# Patient Record
Sex: FEMALE | Race: WHITE | NOT HISPANIC OR LATINO | Employment: FULL TIME | URBAN - METROPOLITAN AREA
[De-identification: names, ages, dates, MRNs, and addresses within clinical notes are randomized per-mention and may not be internally consistent; named-entity substitution may affect disease eponyms.]

---

## 2020-03-11 ENCOUNTER — HOSPITAL ENCOUNTER (OUTPATIENT)
Facility: MEDICAL CENTER | Age: 30
End: 2020-03-11
Attending: PREVENTIVE MEDICINE
Payer: COMMERCIAL

## 2020-03-11 ENCOUNTER — NON-PROVIDER VISIT (OUTPATIENT)
Dept: URGENT CARE | Facility: CLINIC | Age: 30
End: 2020-03-11
Payer: COMMERCIAL

## 2020-03-11 ENCOUNTER — OFFICE VISIT (OUTPATIENT)
Dept: URGENT CARE | Facility: CLINIC | Age: 30
End: 2020-03-11
Payer: COMMERCIAL

## 2020-03-11 VITALS
BODY MASS INDEX: 29.06 KG/M2 | SYSTOLIC BLOOD PRESSURE: 102 MMHG | HEART RATE: 89 BPM | WEIGHT: 164.02 LBS | HEIGHT: 63 IN | OXYGEN SATURATION: 99 % | DIASTOLIC BLOOD PRESSURE: 68 MMHG | TEMPERATURE: 97.5 F

## 2020-03-11 DIAGNOSIS — Z02.1 PRE-EMPLOYMENT DRUG SCREENING: Primary | ICD-10-CM

## 2020-03-11 DIAGNOSIS — Z02.1 PRE-EMPLOYMENT DRUG SCREENING: ICD-10-CM

## 2020-03-11 DIAGNOSIS — Z02.1 PRE-EMPLOYMENT HEALTH SCREENING EXAMINATION: ICD-10-CM

## 2020-03-11 LAB
AMP AMPHETAMINE: NORMAL
BAR BARBITURATES: NORMAL
BZO BENZODIAZEPINES: NORMAL
COC COCAINE: NORMAL
INT CON NEG: NORMAL
INT CON POS: NORMAL
MDMA ECSTASY: NORMAL
MET METHAMPHETAMINES: NORMAL
MTD METHADONE: NORMAL
OPI OPIATES: NORMAL
OXY OXYCODONE: NORMAL
PCP PHENCYCLIDINE: NORMAL
POC URINE DRUG SCREEN OCDRS: NEGATIVE
THC: NORMAL

## 2020-03-11 PROCEDURE — 8915 PR COMPREHENSIVE PHYSICAL: Performed by: NURSE PRACTITIONER

## 2020-03-11 PROCEDURE — 90686 IIV4 VACC NO PRSV 0.5 ML IM: CPT | Performed by: NURSE PRACTITIONER

## 2020-03-11 PROCEDURE — 94375 RESPIRATORY FLOW VOLUME LOOP: CPT | Performed by: PREVENTIVE MEDICINE

## 2020-03-11 PROCEDURE — 80305 DRUG TEST PRSMV DIR OPT OBS: CPT | Mod: QW | Performed by: PREVENTIVE MEDICINE

## 2020-03-11 PROCEDURE — 94010 BREATHING CAPACITY TEST: CPT | Performed by: PREVENTIVE MEDICINE

## 2020-03-11 PROCEDURE — 86787 VARICELLA-ZOSTER ANTIBODY: CPT | Performed by: PREVENTIVE MEDICINE

## 2020-03-11 RX ORDER — SUMATRIPTAN 100 MG/1
100 TABLET, FILM COATED ORAL
COMMUNITY
End: 2020-11-25

## 2020-03-11 SDOH — HEALTH STABILITY: MENTAL HEALTH: HOW OFTEN DO YOU HAVE A DRINK CONTAINING ALCOHOL?: 2-4 TIMES A MONTH

## 2020-03-11 ASSESSMENT — VISUAL ACUITY
OD_CC: 20/15
OS_CC: 20/25

## 2020-03-12 LAB — VZV IGG SER IA-ACNC: 1.82

## 2020-03-13 LAB
GAMMA INTERFERON BACKGROUND BLD IA-ACNC: 0.03 IU/ML
M TB IFN-G BLD-IMP: NEGATIVE
M TB IFN-G CD4+ BCKGRND COR BLD-ACNC: 0 IU/ML
MITOGEN IGNF BCKGRD COR BLD-ACNC: >10 IU/ML
QFT TB2 - NIL TBQ2: 0 IU/ML

## 2020-03-27 ENCOUNTER — EH NON-PROVIDER (OUTPATIENT)
Dept: OCCUPATIONAL MEDICINE | Facility: CLINIC | Age: 30
End: 2020-03-27
Payer: COMMERCIAL

## 2020-03-27 DIAGNOSIS — Z71.85 IMMUNIZATION COUNSELING: ICD-10-CM

## 2020-03-27 NOTE — PROGRESS NOTES
Pre-employment medical surveillance reviewed and signed. Quantiferon result documented in immunizations. Document returned to assigned MA.

## 2020-05-26 ENCOUNTER — TELEPHONE (OUTPATIENT)
Dept: SCHEDULING | Facility: IMAGING CENTER | Age: 30
End: 2020-05-26

## 2020-05-26 ENCOUNTER — TELEPHONE (OUTPATIENT)
Dept: HEALTH INFORMATION MANAGEMENT | Facility: OTHER | Age: 30
End: 2020-05-26

## 2020-05-26 NOTE — TELEPHONE ENCOUNTER
1. Caller Name:Marcio Stoll                Call Back Number:[phone]  [facility] PCP or Specialty Provider: No          2.  Does patient have any new or worsening symptoms of respiratory illness? No.    3.  Does patient have any comoribidities? None     4.  Has the patient traveled in the last 14 days OR had any known contact with someone who is suspected or confirmed to have COVID-19?  No.    5. Disposition: Cleared by RN Triage as potential is low for COVID-19; OK to keep/schedule appointment    Note routed to [facility] Provider: [facility] only.

## 2020-06-02 ENCOUNTER — OFFICE VISIT (OUTPATIENT)
Dept: MEDICAL GROUP | Facility: MEDICAL CENTER | Age: 30
End: 2020-06-02
Payer: COMMERCIAL

## 2020-06-02 ENCOUNTER — HOSPITAL ENCOUNTER (OUTPATIENT)
Dept: LAB | Facility: MEDICAL CENTER | Age: 30
End: 2020-06-02
Attending: FAMILY MEDICINE
Payer: COMMERCIAL

## 2020-06-02 VITALS
WEIGHT: 162 LBS | SYSTOLIC BLOOD PRESSURE: 124 MMHG | HEART RATE: 74 BPM | DIASTOLIC BLOOD PRESSURE: 78 MMHG | HEIGHT: 64 IN | RESPIRATION RATE: 16 BRPM | TEMPERATURE: 97.3 F | OXYGEN SATURATION: 96 % | BODY MASS INDEX: 27.66 KG/M2

## 2020-06-02 DIAGNOSIS — Z13.6 SCREENING FOR CARDIOVASCULAR CONDITION: ICD-10-CM

## 2020-06-02 DIAGNOSIS — R00.2 PALPITATIONS: ICD-10-CM

## 2020-06-02 DIAGNOSIS — J45.990 EXERCISE-INDUCED ASTHMA: ICD-10-CM

## 2020-06-02 DIAGNOSIS — R03.0 ELEVATED BLOOD PRESSURE READING: ICD-10-CM

## 2020-06-02 DIAGNOSIS — G43.109 MIGRAINE WITH AURA AND WITHOUT STATUS MIGRAINOSUS, NOT INTRACTABLE: ICD-10-CM

## 2020-06-02 DIAGNOSIS — M79.604 PAIN OF RIGHT LOWER EXTREMITY: ICD-10-CM

## 2020-06-02 LAB
ERYTHROCYTE [DISTWIDTH] IN BLOOD BY AUTOMATED COUNT: 41.8 FL (ref 35.9–50)
HCT VFR BLD AUTO: 43.4 % (ref 37–47)
HGB BLD-MCNC: 14.7 G/DL (ref 12–16)
MCH RBC QN AUTO: 31 PG (ref 27–33)
MCHC RBC AUTO-ENTMCNC: 33.9 G/DL (ref 33.6–35)
MCV RBC AUTO: 91.6 FL (ref 81.4–97.8)
PLATELET # BLD AUTO: 312 K/UL (ref 164–446)
PMV BLD AUTO: 10.3 FL (ref 9–12.9)
RBC # BLD AUTO: 4.74 M/UL (ref 4.2–5.4)
WBC # BLD AUTO: 6.1 K/UL (ref 4.8–10.8)

## 2020-06-02 PROCEDURE — 99204 OFFICE O/P NEW MOD 45 MIN: CPT | Performed by: FAMILY MEDICINE

## 2020-06-02 PROCEDURE — 85027 COMPLETE CBC AUTOMATED: CPT

## 2020-06-02 PROCEDURE — 84443 ASSAY THYROID STIM HORMONE: CPT

## 2020-06-02 PROCEDURE — 36415 COLL VENOUS BLD VENIPUNCTURE: CPT

## 2020-06-02 PROCEDURE — 80061 LIPID PANEL: CPT

## 2020-06-02 PROCEDURE — 80053 COMPREHEN METABOLIC PANEL: CPT

## 2020-06-02 RX ORDER — ALBUTEROL SULFATE 90 UG/1
2 AEROSOL, METERED RESPIRATORY (INHALATION) EVERY 6 HOURS PRN
COMMUNITY
End: 2020-06-02

## 2020-06-02 RX ORDER — SUMATRIPTAN 100 MG/1
100 TABLET, FILM COATED ORAL
COMMUNITY
End: 2020-06-02

## 2020-06-02 RX ORDER — ALBUTEROL SULFATE 90 UG/1
2 AEROSOL, METERED RESPIRATORY (INHALATION) EVERY 4 HOURS PRN
Qty: 1 INHALER | Refills: 3 | Status: SHIPPED | OUTPATIENT
Start: 2020-06-02 | End: 2020-11-25

## 2020-06-02 RX ORDER — SUMATRIPTAN 100 MG/1
100 TABLET, FILM COATED ORAL
Qty: 10 TAB | Refills: 3 | Status: SHIPPED | OUTPATIENT
Start: 2020-06-02 | End: 2020-11-25

## 2020-06-02 SDOH — HEALTH STABILITY: MENTAL HEALTH: HOW MANY STANDARD DRINKS CONTAINING ALCOHOL DO YOU HAVE ON A TYPICAL DAY?: 1 OR 2

## 2020-06-02 SDOH — HEALTH STABILITY: MENTAL HEALTH: HOW OFTEN DO YOU HAVE A DRINK CONTAINING ALCOHOL?: 2-4 TIMES A MONTH

## 2020-06-02 SDOH — HEALTH STABILITY: MENTAL HEALTH: HOW OFTEN DO YOU HAVE 6 OR MORE DRINKS ON ONE OCCASION?: NEVER

## 2020-06-02 ASSESSMENT — PATIENT HEALTH QUESTIONNAIRE - PHQ9: CLINICAL INTERPRETATION OF PHQ2 SCORE: 0

## 2020-06-02 NOTE — PROGRESS NOTES
cc: Blood pressure    Subjective:     Marcio Stoll is a 29 y.o. female presenting to [facility] care:    1.  Blood pressure: She reports having elevated blood pressures about 6 months ago.  She was getting readings in the 150s to 170s systolic consistently.  She also had elevated heart rates to the 130s recorded on her Fitbit, palpitations, intermittent chest pain.  She does report going through a significant amount of stress at that time, was finishing up school, getting , moving to [place], etc.  At her previous provider's office, she was planning to get a Holter monitor and a referral to cardiology, but was unable to complete that.  She reports that blood work almost a year ago was normal.  She reports taking lisinopril for a couple months, but has been off for about 3 months.  The lisinopril seemed to work well when she was on it.  She has been taking various supplements, walking daily, exercising regularly, has lost about 12 pounds.  She reports having a healthy, low-salt diet.  She is a nurse on the telemetry floor, and she does admit to a fair amount of anxiety over her own health based on what she sees in the hospital.  She feels like anxiety seems to be getting worse over time.    2.  Knee pain: Has been having a dull, and tense ache in the back of her right knee for the past week.  It radiates up to her thigh and the top of her shin.  She has tried compression socks, massaging the area.  Denies any redness, swelling, injuries, numbness, tingling.  She is primarily worried about a blood clot.    3.  Asthma: She reports a history of exercise-induced asthma, well controlled with an albuterol before and after exercise.  Denies any cough, shortness of breath currently.    4.  Migraines: Has a history of migraines with auras, less vision changes.  Has been stable, uses Excedrin, Imitrex, Benadryl as needed.  Has at most 3 migraines a month.  She reports being on birth control in the past, but has been off  "of birth control for the past 6 months.  She and her significant other are considering having children.      Review of systems:  See above.  All other systems are reviewed and are negative      Current Outpatient Medications:   •  albuterol 108 (90 Base) MCG/ACT Aero Soln inhalation aerosol, Inhale 2 Puffs by mouth every four hours as needed for Shortness of Breath., Disp: 1 Inhaler, Rfl: 3  •  sumatriptan (IMITREX) 100 MG tablet, Take 1 Tab by mouth Once PRN for Migraine for up to 1 dose., Disp: 10 Tab, Rfl: 3    Allergies, past medical history, past surgical history, family history, social history reviewed and updated    Objective:     Vitals: /78   Pulse 74   Temp 36.3 °C (97.3 °F)   Resp 16   Ht 1.613 m (5' 3.5\")   Wt 73.5 kg (162 lb)   LMP 05/28/2020 (Approximate)   SpO2 96%   BMI 28.25 kg/m²   General: Alert, pleasant, NAD  HEENT: Normocephalic.  EOMI, no icterus or pallor.  Conjunctivae and lids normal. External ears normal. Oropharynx non-erythematous, mucous membranes moist.  Neck supple.  No thyromegaly or masses palpated. No cervical or supraclavicular lymphadenopathy.  Heart: Regular rate and rhythm.  S1 and S2 normal.  No murmurs appreciated.  Respiratory: Normal respiratory effort.  Clear to auscultation bilaterally.  Abdomen: Non-distended, soft  Skin: Warm, dry, no rashes.  Musculoskeletal: Gait is normal.  Moves all extremities well.  Extremities: No leg edema.  Distal pulses 2+ bilaterally  Psych:  Affect/mood is normal, judgement is good, memory is intact, grooming is appropriate.    Assessment/Plan:     Marcio was seen today for establish care.    Diagnoses and all orders for this visit:    Elevated blood pressure reading  Palpitations  New problems.  We discussed work-up of her elevated blood pressures, will do a 24-hour blood pressure monitor, Holter, labs.  Recommended stopping her supplements, continue with a healthy diet, regular exercise.  Also recommended starting a prenatal " vitamin.  Advised to not take lisinopril as she is considering getting pregnant.  We discussed if her work-up is unremarkable, counseling/cognitive behavioral therapy may be helpful for her.  It seems like her anxiety may be a significant trigger for her.  She was open to this, will refer her if needed  -     REFERRAL TO 24-HOUR BLOOD PRESSURE MONITORING  -     Holter Monitor / Event Recorder; Future  -     CBC WITHOUT DIFFERENTIAL; Future  -     Comp Metabolic Panel; Future  -     TSH WITH REFLEX TO FT4; Future    Pain of right lower extremity  New problem.  Discussed that a blood clot seems very unlikely, but will check an ultrasound for reassurance.  If normal and if symptoms persists, we discussed a referral to physical therapy  -     US-EXTREMITY VENOUS LOWER UNILAT RIGHT; Future    Exercise-induced asthma  New problem, stable, continue albuterol as needed  -     albuterol 108 (90 Base) MCG/ACT Aero Soln inhalation aerosol; Inhale 2 Puffs by mouth every four hours as needed for Shortness of Breath.    Migraine with aura and without status migrainosus, not intractable  New problem, stable, continue Imitrex as needed  -     sumatriptan (IMITREX) 100 MG tablet; Take 1 Tab by mouth Once PRN for Migraine for up to 1 dose.    Screening for cardiovascular condition  -     Lipid Profile; Future          Return in about 3 months (around 9/2/2020) for routine follow up.

## 2020-06-03 LAB
ALBUMIN SERPL BCP-MCNC: 4.4 G/DL (ref 3.2–4.9)
ALBUMIN/GLOB SERPL: 1.7 G/DL
ALP SERPL-CCNC: 45 U/L (ref 30–99)
ALT SERPL-CCNC: 24 U/L (ref 2–50)
ANION GAP SERPL CALC-SCNC: 11 MMOL/L (ref 7–16)
AST SERPL-CCNC: 17 U/L (ref 12–45)
BILIRUB SERPL-MCNC: 0.9 MG/DL (ref 0.1–1.5)
BUN SERPL-MCNC: 9 MG/DL (ref 8–22)
CALCIUM SERPL-MCNC: 9.6 MG/DL (ref 8.5–10.5)
CHLORIDE SERPL-SCNC: 97 MMOL/L (ref 96–112)
CHOLEST SERPL-MCNC: 182 MG/DL (ref 100–199)
CO2 SERPL-SCNC: 28 MMOL/L (ref 20–33)
CREAT SERPL-MCNC: 0.64 MG/DL (ref 0.5–1.4)
FASTING STATUS PATIENT QL REPORTED: NORMAL
GLOBULIN SER CALC-MCNC: 2.6 G/DL (ref 1.9–3.5)
GLUCOSE SERPL-MCNC: 92 MG/DL (ref 65–99)
HDLC SERPL-MCNC: 63 MG/DL
LDLC SERPL CALC-MCNC: 106 MG/DL
POTASSIUM SERPL-SCNC: 3.4 MMOL/L (ref 3.6–5.5)
PROT SERPL-MCNC: 7 G/DL (ref 6–8.2)
SODIUM SERPL-SCNC: 136 MMOL/L (ref 135–145)
TRIGL SERPL-MCNC: 67 MG/DL (ref 0–149)
TSH SERPL DL<=0.005 MIU/L-ACNC: 1.35 UIU/ML (ref 0.38–5.33)

## 2020-06-05 ENCOUNTER — HOSPITAL ENCOUNTER (OUTPATIENT)
Dept: RADIOLOGY | Facility: MEDICAL CENTER | Age: 30
End: 2020-06-05
Attending: FAMILY MEDICINE
Payer: COMMERCIAL

## 2020-06-05 DIAGNOSIS — M79.604 PAIN OF RIGHT LOWER EXTREMITY: ICD-10-CM

## 2020-06-05 PROCEDURE — 93971 EXTREMITY STUDY: CPT | Mod: RT

## 2020-06-05 PROCEDURE — 93971 EXTREMITY STUDY: CPT | Mod: 26 | Performed by: INTERNAL MEDICINE

## 2020-06-15 ENCOUNTER — NON-PROVIDER VISIT (OUTPATIENT)
Dept: VASCULAR LAB | Facility: MEDICAL CENTER | Age: 30
End: 2020-06-15
Attending: INTERNAL MEDICINE
Payer: COMMERCIAL

## 2020-06-15 PROCEDURE — 93786 AMBL BP MNTR W/SW REC ONLY: CPT

## 2020-06-15 PROCEDURE — 93788 AMBL BP MNTR W/SW A/R: CPT

## 2020-06-23 NOTE — PROGRESS NOTES
Ambulatory blood pressure monitor results reviewed  Full report under media tab  Inadequate sampling with multiple error messages.  Only 24% of readings successful.    Mean of available readings 117/83  She also took home readings which will be scanned in separately to the media tab    Clinical correlation needed.  We will defer further follow-up to PCP unless otherwise requested    Michael Bloch, MD  [facility]    Cc: Dr. Thornton

## 2020-07-21 ENCOUNTER — HOSPITAL ENCOUNTER (OUTPATIENT)
Dept: LAB | Facility: MEDICAL CENTER | Age: 30
End: 2020-07-21
Attending: FAMILY MEDICINE
Payer: COMMERCIAL

## 2020-07-21 ENCOUNTER — PATIENT MESSAGE (OUTPATIENT)
Dept: MEDICAL GROUP | Facility: MEDICAL CENTER | Age: 30
End: 2020-07-21

## 2020-07-21 DIAGNOSIS — Z34.90 PREGNANCY, UNSPECIFIED GESTATIONAL AGE: ICD-10-CM

## 2020-07-21 DIAGNOSIS — N92.6 MISSED PERIOD: ICD-10-CM

## 2020-07-21 LAB — HCG SERPL QL: POSITIVE

## 2020-07-21 PROCEDURE — 36415 COLL VENOUS BLD VENIPUNCTURE: CPT

## 2020-07-21 PROCEDURE — 84703 CHORIONIC GONADOTROPIN ASSAY: CPT

## 2020-08-07 ENCOUNTER — OFFICE VISIT (OUTPATIENT)
Dept: URGENT CARE | Facility: PHYSICIAN GROUP | Age: 30
End: 2020-08-07
Payer: COMMERCIAL

## 2020-08-07 VITALS
BODY MASS INDEX: 28.85 KG/M2 | DIASTOLIC BLOOD PRESSURE: 76 MMHG | SYSTOLIC BLOOD PRESSURE: 138 MMHG | HEIGHT: 64 IN | WEIGHT: 169 LBS | OXYGEN SATURATION: 98 % | HEART RATE: 87 BPM | TEMPERATURE: 98.5 F | RESPIRATION RATE: 18 BRPM

## 2020-08-07 DIAGNOSIS — N92.6 MISSED PERIOD: ICD-10-CM

## 2020-08-07 DIAGNOSIS — Z32.01 POSITIVE PREGNANCY TEST: ICD-10-CM

## 2020-08-07 LAB
INT CON NEG: NORMAL
INT CON POS: NORMAL
POC URINE PREGNANCY TEST: POSITIVE

## 2020-08-07 PROCEDURE — 81025 URINE PREGNANCY TEST: CPT | Performed by: NURSE PRACTITIONER

## 2020-08-07 PROCEDURE — 99214 OFFICE O/P EST MOD 30 MIN: CPT | Performed by: NURSE PRACTITIONER

## 2020-08-07 SDOH — HEALTH STABILITY: MENTAL HEALTH: HOW MANY STANDARD DRINKS CONTAINING ALCOHOL DO YOU HAVE ON A TYPICAL DAY?: NOT ASKED

## 2020-08-07 SDOH — HEALTH STABILITY: MENTAL HEALTH: HOW OFTEN DO YOU HAVE A DRINK CONTAINING ALCOHOL?: NOT ASKED

## 2020-08-07 ASSESSMENT — ENCOUNTER SYMPTOMS
FLANK PAIN: 0
SHORTNESS OF BREATH: 0
HEADACHES: 0
COUGH: 0
NAUSEA: 0
FEVER: 0
CHILLS: 0
DIZZINESS: 0
WHEEZING: 0
VOMITING: 0
DIARRHEA: 0
PALPITATIONS: 0
ABDOMINAL PAIN: 0
CONSTIPATION: 0

## 2020-08-07 ASSESSMENT — FIBROSIS 4 INDEX: FIB4 SCORE: 0.33

## 2020-08-07 NOTE — PROGRESS NOTES
Subjective:   Marcio Stoll  is a 29 y.o. female who presents for Other (Poss pregnant)        29-year-old female patient reports urgent care for missed period and possible pregnancy.  Patient states that she has taken pregnancy test at home and is noticing that the pink lines are becoming more faint so she is making sure that she is still pregnant.  Patient's last period was June 26.  Does have an OB appointment scheduled at the end of August.  Patient denies any spotting, abdominal pain but does note some mild cramping.  Is currently taking a prenatal vitamin avoiding alcohol, exercising and cleaning up her diet.    Other  This is a new problem. Pertinent negatives include no abdominal pain, chills, coughing, fever, headaches, nausea or vomiting.     Review of Systems   Constitutional: Negative for chills, fever and malaise/fatigue.   Respiratory: Negative for cough, shortness of breath and wheezing.    Cardiovascular: Negative for palpitations.   Gastrointestinal: Negative for abdominal pain, constipation, diarrhea, nausea and vomiting.   Genitourinary: Negative for dysuria, flank pain, frequency, hematuria and urgency.   Musculoskeletal: Negative for joint pain.   Neurological: Negative for dizziness and headaches.     History reviewed. No pertinent past medical history. History reviewed. No pertinent surgical history.   Social History     Socioeconomic History   • Marital status:      Spouse name: Not on file   • Number of children: Not on file   • Years of education: Not on file   • Highest education level: Not on file   Occupational History   • Not on file   Social Needs   • Financial resource strain: Not on file   • Food insecurity     Worry: Not on file     Inability: Not on file   • Transportation needs     Medical: Not on file     Non-medical: Not on file   Tobacco Use   • Smoking status: Never Smoker   • Smokeless tobacco: Never Used   Substance and Sexual Activity   • Alcohol use: Not  "Currently     Binge frequency: Never     Comment: socially   • Drug use: Never   • Sexual activity: Yes   Lifestyle   • Physical activity     Days per week: Not on file     Minutes per session: Not on file   • Stress: Not on file   Relationships   • Social connections     Talks on phone: Not on file     Gets together: Not on file     Attends [employer] service: Not on file     Active member of club or organization: Not on file     Attends meetings of clubs or organizations: Not on file     Relationship status: Not on file   • Intimate partner violence     Fear of current or ex partner: Not on file     Emotionally abused: Not on file     Physically abused: Not on file     Forced sexual activity: Not on file   Other Topics Concern   • Not on file   Social History Narrative    ** Merged History Encounter **         RN on tele floor at [employer]    Patient has no known allergies.       Objective:   /76   Pulse 87   Temp 36.9 °C (98.5 °F) (Temporal)   Resp 18   Ht 1.626 m (5' 4\")   Wt 76.7 kg (169 lb)   LMP 06/26/2020 (Exact Date)   SpO2 98%   BMI 29.01 kg/m²   Physical Exam  Vitals signs reviewed.   Constitutional:       Appearance: Normal appearance.   Cardiovascular:      Rate and Rhythm: Normal rate and regular rhythm.      Heart sounds: Normal heart sounds.   Pulmonary:      Effort: Pulmonary effort is normal.      Breath sounds: Normal breath sounds.   Skin:     General: Skin is warm.   Neurological:      Mental Status: She is alert and oriented to person, place, and time.   Psychiatric:         Mood and Affect: Mood normal.         Behavior: Behavior normal.         Thought Content: Thought content normal.         Judgment: Judgment normal.           Assessment/Plan:     1. Missed period  - POCT PREGNANCY - Positive    2. Positive pregnancy test    Discussed positive pregnancy test is indicative of a positive pregnancy.  Advised patient to continue to use prenatals, eat a well-balanced diet try to reduce " stress, avoid secondhand smoke and alcohol and increase fluids.  Advised patient to keep appointment with OB for further prenatal care.    Supportive care, differential diagnoses, and indications for immediate follow-up discussed with patient.    Pathogenesis of diagnosis discussed including typical length and natural progression. Patient expresses understanding and agrees to plan.    Instructed patient to return to clinic for worsening symptoms or symptoms that persist for 7 to 10 days     Please note that this dictation was created using voice recognition software. I have made every reasonable attempt to correct obvious errors, but I expect that there are errors of grammar and possibly content that I did not discover before finalizing the note.

## 2020-08-27 ENCOUNTER — GYNECOLOGY VISIT (OUTPATIENT)
Dept: OBGYN | Facility: CLINIC | Age: 30
End: 2020-08-27
Payer: COMMERCIAL

## 2020-08-27 VITALS
DIASTOLIC BLOOD PRESSURE: 99 MMHG | SYSTOLIC BLOOD PRESSURE: 128 MMHG | BODY MASS INDEX: 28.63 KG/M2 | WEIGHT: 167.7 LBS | HEIGHT: 64 IN

## 2020-08-27 DIAGNOSIS — G43.109 MIGRAINE WITH AURA AND WITHOUT STATUS MIGRAINOSUS, NOT INTRACTABLE: ICD-10-CM

## 2020-08-27 DIAGNOSIS — J45.990 EXERCISE-INDUCED ASTHMA: ICD-10-CM

## 2020-08-27 DIAGNOSIS — N93.8 DUB (DYSFUNCTIONAL UTERINE BLEEDING): ICD-10-CM

## 2020-08-27 DIAGNOSIS — Z86.711 HISTORY OF PULMONARY EMBOLISM: ICD-10-CM

## 2020-08-27 LAB
INT CON NEG: NEGATIVE
INT CON POS: POSITIVE
POC URINE PREGNANCY TEST: POSITIVE

## 2020-08-27 PROCEDURE — 76801 OB US < 14 WKS SINGLE FETUS: CPT | Performed by: OBSTETRICS & GYNECOLOGY

## 2020-08-27 PROCEDURE — 99203 OFFICE O/P NEW LOW 30 MIN: CPT | Performed by: OBSTETRICS & GYNECOLOGY

## 2020-08-27 PROCEDURE — 81025 URINE PREGNANCY TEST: CPT | Performed by: OBSTETRICS & GYNECOLOGY

## 2020-08-27 ASSESSMENT — FIBROSIS 4 INDEX: FIB4 SCORE: 0.33

## 2020-08-27 NOTE — PROGRESS NOTES
"Chief complaint: Secondary menorrhea    Marcio Stoll,  29 y.o.  1 para 0 at 8 weeks and 6 days gestational age by last menstrual period of 2020.  presents today with complaint of dysfunctional uterine bleeding.  Positive pregnancy test at home    Subjective : Patient presents to the office for absent menses.      Patient has a history of asthma, pulmonary embolism while on birth control pills and migraines with aura    Nausea/Vomiting: No    Abdominal /pelvic cramping: No  Vaginal bleeding: No  Positive home UPT yes  Other symptoms: None    Pertinent positives documented in HPI and all other systems reviewed & are negative    OB History   No obstetric history on file.       Past Gyn history: last pap 1 year ago in [place], hx STDs none    Past Medical History:   Diagnosis Date   • Asthma     Dx'd as child   • Clotting disorder (HCC)     Dx'd on    • Migraine        History reviewed. No pertinent surgical history.    Meds: Prenatal vitamins, Imitrex    Allergies: Patient has no known allergies.    Physical Exam:    /99   Ht 1.626 m (5' 4\")   Wt 76.1 kg (167 lb 11.2 oz)   BMI 28.79 kg/m²   Gen: well-appearing, well-hydrated, well-nourished  HEENT: normal;   PERRLA, EOMI, sclera clear  Lungs: Clear to auscultation  Heart: RRR No M  Abd: abdomen is soft without significant tenderness, masses, organomegaly or guarding  Pelvic: External genitalia normal  Ext: NT bilaterally, no cyanosis, clubbing or edema    Recent Results (from the past 336 hour(s))   POCT Pregnancy    Collection Time: 20 10:32 AM   Result Value Ref Range    POC Urine Pregnancy Test POSITIVE Negative    Internal Control Positive Positive     Internal Control Negative Negative        Ultrasound:     Transvaginal US performed and per my read:    Indication: Dating    Findings: marcus intrauterine pregnancy @9 weeks and 4 days gestational age by CRL.   Positive gestational sac.  Positive yolk sac. "   Positive fetal cardiac activity @180 BPM.   Right ovary normal. Left Ovary normal. Cervical length 3.8 cm.   No free fluid in the cul-de-sac.    Impression: viable IUP @9 weeks and 4 days. EDC by US of March 28, 2021      Assessment:  29 y.o. No obstetric history on file.  amenorrhea  Pregnancy exam/test positive    Plan:  3 weeks for new OB appt  Pap smear at new OB visit  Normal pregnancy symptoms discussed  SAB/labor precautions educated  Order prenatal labs and any additional imaging needed at new OB visit  Drink at least 2 liters of water daily  Exercise 30 minutes daily  Call MD santos/ questions or concerns    We will discuss NIPT versus quad screen at next visit.  CF and SMA testing will also be offered    As far as asthma is concerned, discussed with the patient symptoms of worsening disease.  If she begins to use her inhaler more than twice a week or has any nighttime symptoms, she is instructed to return to the clinic or call us    Patient does have a history of migraine with aura.  Has been on Imitrex.  Discussed with the patient that if possible, avoid use of Imitrex in pregnancy.  Other modalities for management of migraine pain were discussed the patient.  If no improvement, may discuss Fioricet with patient    Patient has a history of a pulmonary embolism while on estrogen.  Given this past history of a pulmonary realism and the fact that she is pregnant, patient is at increased risk for another thrombotic event.  Discussed with patient pros and cons of anticoagulation during pregnancy.  We will begin anticoagulation given her history.    Patient also reports that for the last few months her diastolic pressures also been elevated.  We will continue to monitor.  In addition to normal OB labs, will also obtain baseline 24-hour protein as well as complete metabolic panel.  If diastolic pressure continues to be elevated, patient may need antihypertensive treatment during the pregnancy.    Also discussed the  patient possibility of ASA 81 mg treatment during her pregnancy to decrease risk of preeclampsia given her risk factors.    Final due date March 28, 2021, consistent with today's ultrasound    All questions answered

## 2020-08-27 NOTE — NON-PROVIDER
"DUB/Pregnancy Test  LMP:6/26/20  UPT positive, done in clinic.  Good phone #:[phone]  Pharmacy verified.  Pt states no other complaints for today.  Pt states \" and planned pregnancy. FOB is involved and supportive\".  "

## 2020-09-02 ENCOUNTER — APPOINTMENT (OUTPATIENT)
Dept: RADIOLOGY | Facility: MEDICAL CENTER | Age: 30
End: 2020-09-02
Attending: EMERGENCY MEDICINE
Payer: COMMERCIAL

## 2020-09-02 ENCOUNTER — HOSPITAL ENCOUNTER (EMERGENCY)
Facility: MEDICAL CENTER | Age: 30
End: 2020-09-02
Attending: EMERGENCY MEDICINE
Payer: COMMERCIAL

## 2020-09-02 VITALS
OXYGEN SATURATION: 99 % | HEART RATE: 98 BPM | WEIGHT: 171.52 LBS | HEIGHT: 64 IN | TEMPERATURE: 98.4 F | RESPIRATION RATE: 16 BRPM | BODY MASS INDEX: 29.28 KG/M2 | DIASTOLIC BLOOD PRESSURE: 91 MMHG | SYSTOLIC BLOOD PRESSURE: 127 MMHG

## 2020-09-02 DIAGNOSIS — O20.0 THREATENED MISCARRIAGE IN EARLY PREGNANCY: ICD-10-CM

## 2020-09-02 DIAGNOSIS — Z3A.09 9 WEEKS GESTATION OF PREGNANCY: ICD-10-CM

## 2020-09-02 LAB
ACTION RH IMMUNE GLOB 8505RHG: NORMAL
ALBUMIN SERPL BCP-MCNC: 3.8 G/DL (ref 3.2–4.9)
ALBUMIN/GLOB SERPL: 1.5 G/DL
ALP SERPL-CCNC: 32 U/L (ref 30–99)
ALT SERPL-CCNC: 29 U/L (ref 2–50)
ANION GAP SERPL CALC-SCNC: 11 MMOL/L (ref 7–16)
APPEARANCE UR: ABNORMAL
AST SERPL-CCNC: 13 U/L (ref 12–45)
B-HCG SERPL-ACNC: ABNORMAL MIU/ML (ref 0–5)
BACTERIA #/AREA URNS HPF: NEGATIVE /HPF
BASOPHILS # BLD AUTO: 0.3 % (ref 0–1.8)
BASOPHILS # BLD: 0.04 K/UL (ref 0–0.12)
BILIRUB SERPL-MCNC: 0.3 MG/DL (ref 0.1–1.5)
BILIRUB UR QL STRIP.AUTO: NEGATIVE
BUN SERPL-MCNC: 10 MG/DL (ref 8–22)
CALCIUM SERPL-MCNC: 8.8 MG/DL (ref 8.5–10.5)
CHLORIDE SERPL-SCNC: 104 MMOL/L (ref 96–112)
CO2 SERPL-SCNC: 22 MMOL/L (ref 20–33)
COLOR UR: YELLOW
CREAT SERPL-MCNC: 0.39 MG/DL (ref 0.5–1.4)
EOSINOPHIL # BLD AUTO: 0.16 K/UL (ref 0–0.51)
EOSINOPHIL NFR BLD: 1.4 % (ref 0–6.9)
EPI CELLS #/AREA URNS HPF: NEGATIVE /HPF
ERYTHROCYTE [DISTWIDTH] IN BLOOD BY AUTOMATED COUNT: 41.4 FL (ref 35.9–50)
GLOBULIN SER CALC-MCNC: 2.6 G/DL (ref 1.9–3.5)
GLUCOSE SERPL-MCNC: 96 MG/DL (ref 65–99)
GLUCOSE UR STRIP.AUTO-MCNC: NEGATIVE MG/DL
HCT VFR BLD AUTO: 38.1 % (ref 37–47)
HGB BLD-MCNC: 13 G/DL (ref 12–16)
HYALINE CASTS #/AREA URNS LPF: ABNORMAL /LPF
IMM GRANULOCYTES # BLD AUTO: 0.04 K/UL (ref 0–0.11)
IMM GRANULOCYTES NFR BLD AUTO: 0.3 % (ref 0–0.9)
KETONES UR STRIP.AUTO-MCNC: NEGATIVE MG/DL
LEUKOCYTE ESTERASE UR QL STRIP.AUTO: NEGATIVE
LYMPHOCYTES # BLD AUTO: 2.48 K/UL (ref 1–4.8)
LYMPHOCYTES NFR BLD: 21 % (ref 22–41)
MCH RBC QN AUTO: 31 PG (ref 27–33)
MCHC RBC AUTO-ENTMCNC: 34.1 G/DL (ref 33.6–35)
MCV RBC AUTO: 90.9 FL (ref 81.4–97.8)
MICRO URNS: ABNORMAL
MONOCYTES # BLD AUTO: 0.77 K/UL (ref 0–0.85)
MONOCYTES NFR BLD AUTO: 6.5 % (ref 0–13.4)
NEUTROPHILS # BLD AUTO: 8.31 K/UL (ref 2–7.15)
NEUTROPHILS NFR BLD: 70.5 % (ref 44–72)
NITRITE UR QL STRIP.AUTO: NEGATIVE
NRBC # BLD AUTO: 0 K/UL
NRBC BLD-RTO: 0 /100 WBC
NUMBER OF RH DOSES IND 8505RD: 1
PH UR STRIP.AUTO: 7 [PH] (ref 5–8)
PLATELET # BLD AUTO: 306 K/UL (ref 164–446)
PMV BLD AUTO: 9.8 FL (ref 9–12.9)
POTASSIUM SERPL-SCNC: 3.5 MMOL/L (ref 3.6–5.5)
PROT SERPL-MCNC: 6.4 G/DL (ref 6–8.2)
PROT UR QL STRIP: NEGATIVE MG/DL
RBC # BLD AUTO: 4.19 M/UL (ref 4.2–5.4)
RBC # URNS HPF: ABNORMAL /HPF
RBC UR QL AUTO: ABNORMAL
RH BLD: NORMAL
SODIUM SERPL-SCNC: 137 MMOL/L (ref 135–145)
SP GR UR STRIP.AUTO: 1.01
UROBILINOGEN UR STRIP.AUTO-MCNC: 0.2 MG/DL
WBC # BLD AUTO: 11.8 K/UL (ref 4.8–10.8)
WBC #/AREA URNS HPF: ABNORMAL /HPF

## 2020-09-02 PROCEDURE — 700102 HCHG RX REV CODE 250 W/ 637 OVERRIDE(OP): Performed by: EMERGENCY MEDICINE

## 2020-09-02 PROCEDURE — 76801 OB US < 14 WKS SINGLE FETUS: CPT

## 2020-09-02 PROCEDURE — 86901 BLOOD TYPING SEROLOGIC RH(D): CPT

## 2020-09-02 PROCEDURE — 96374 THER/PROPH/DIAG INJ IV PUSH: CPT

## 2020-09-02 PROCEDURE — 85025 COMPLETE CBC W/AUTO DIFF WBC: CPT

## 2020-09-02 PROCEDURE — 81001 URINALYSIS AUTO W/SCOPE: CPT

## 2020-09-02 PROCEDURE — 99284 EMERGENCY DEPT VISIT MOD MDM: CPT

## 2020-09-02 PROCEDURE — A9270 NON-COVERED ITEM OR SERVICE: HCPCS | Performed by: EMERGENCY MEDICINE

## 2020-09-02 PROCEDURE — 80053 COMPREHEN METABOLIC PANEL: CPT

## 2020-09-02 PROCEDURE — 84702 CHORIONIC GONADOTROPIN TEST: CPT

## 2020-09-02 RX ORDER — ACETAMINOPHEN 325 MG/1
650 TABLET ORAL ONCE
Status: COMPLETED | OUTPATIENT
Start: 2020-09-02 | End: 2020-09-02

## 2020-09-02 RX ADMIN — ACETAMINOPHEN 650 MG: 325 TABLET, FILM COATED ORAL at 16:30

## 2020-09-02 ASSESSMENT — FIBROSIS 4 INDEX: FIB4 SCORE: 0.33

## 2020-09-02 NOTE — ED PROVIDER NOTES
ED Provider Note    CHIEF COMPLAINT  Chief Complaint   Patient presents with   • Vaginal Bleeding   • Pregnancy       HPI  Marcio Stoll is a 29 y.o. female who presents for evaluation of vaginal bleeding and pregnancy.  The patient is G1, P0 Ab0 whose LMP was 6/26/2020.  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  The patient states she went to bed around 9 AM this morning and woke up with a headache around 130.  At that time she noticed which she felt like was some passage of fluid or leaking from her vaginal area and noticed some blood.  The patient's not been having persistent cramping is not passing any large clots or tissue that she is aware of.  Patient does relate a history of borderline diastolic hypertension but has not received any work-up or treatment.  She states that last December she was having palpitations but was never able to be seen for Holter monitoring.  The patient's had no recent illness including: Fever, chills, URI symptoms, cardiorespiratory symptoms, nausea, vomiting, hematuria, dysuria.  No other acute symptomatology or complaints.    REVIEW OF SYSTEMS  See HPI for further details. All other systems negative.    PAST MEDICAL HISTORY  Past Medical History:   Diagnosis Date   • Asthma     Dx'd as child   • Clotting disorder (HCC)     Dx'd on 2010   • History of pulmonary embolism 8/27/2020   • Hypertension    • Migraine        FAMILY HISTORY  Family History   Problem Relation Age of Onset   • Hypertension Mother    • Alcohol abuse Mother    • Hypertension Father    • Breast Cancer Maternal Aunt    • Hypertension Maternal Grandmother    • Diabetes Maternal Grandmother    • Hypertension Paternal Grandmother    • Diabetes Paternal Grandmother        SOCIAL HISTORY  Resides locally;    SURGICAL HISTORY  History reviewed. No pertinent surgical history.    CURRENT MEDICATIONS  See nurse's notes    ALLERGIES  No Known Allergies    PHYSICAL  "EXAM  VITAL SIGNS: /110   Pulse (!) 120   Temp 36.9 °C (98.4 °F) (Oral)   Resp 20   Ht 1.626 m (5' 4\")   Wt 77.8 kg (171 lb 8.3 oz)   LMP 06/26/2020 (Exact Date)   SpO2 98%   BMI 29.44 kg/m²    Constitutional: Pleasant 29-year-old female, well developed, Well nourished, No acute distress, Non-toxic appearance.   HENT: Normocephalic;  Eyes: PERRL, EOMI, Conjunctiva normal, No discharge.   Neck: Normal range of motion, No tenderness, Supple, No stridor.   Lymphatic: No lymphadenopathy noted.   Cardiovascular: Regular rate and rhythm without mumurs, gallups, rubs   Thorax & Lungs: Normal Equal breath sounds, No respiratory distress, No wheezing, no stridor, no rales. No chest tenderness.   Abdomen: Soft, nontender, nondistended, no organomegaly, positive bowel sounds normal in quality. No guarding or rebound.  Pelvic examination performed with female nurse escort in the room; external genitalia is normal; small amount of blood in the vaginal vault, no lesions identified, cervical loss is closed with no tissue, uterus approximately 10 weeks size, adnexa is nontender without masses or fullness;  Skin: Good skin turgor, pink, warm, dry. No rashes, petechiae, purpura. Normal capillary refill.   Back: No tenderness, No CVA tenderness.   Extremities: Intact distal pulses, No edema, No tenderness, No cyanosis,  Vascular: Pulses are 2+, symmetric in the upper and lower extremities.  Musculoskeletal: Good range of motion in all major joints. No tenderness to palpation or major deformities noted.   Neurologic: Alert & oriented x 3,  No gross focal deficits noted.   Psychiatric: Affect normal, Judgment normal, Mood normal.       RADIOLOGY/PROCEDURES  US-OB 1ST TRIMESTER WITH TRANSVAGINAL (COMBO)   Final Result      1.  Single live intrauterine gestation of an estimated 9 weeks 3 days.   2.  Probable multifocal small subchorionic hemorrhage.  Follow-up recommended.   3.  Small amount of fluid noted in the cervix.    "         COURSE & MEDICAL DECISION MAKING  Pertinent Labs & Imaging studies reviewed. (See chart for details)  1.  RhoGam    Laboratory studies: CBC shows white count of 11.8, 70% neutrophils, 21% lymphocytes, 6% monocytes, hemoglobin 13.0 hematocrit 30.1; CMP shows potassium 3.5 otherwise within normal; urinalysis is negative for nitrite and leukocyte esterase, WBC 0-2, RBCs , epithelial cells negative, bacteria negative; quantitative beta-hCG is 70,820; Rh is negative;    Discussion: At this time, the patient has findings consistent with threatened .  Patient has a single live intrauterine gestation of 9 weeks 3 days by ultra sonography.  There is probable multi focal subchorionic hemorrhage.  The patient is Rh- and will be given RhoGam.  At this time, I discussed the findings and treatment plan with the patient.  The patient is to be a pelvic rest.  She was instructed to follow-up with her OB/GYN physician next week.  She is also to remember to have her blood pressure monitor that she has been having borderline diastolic hypertension.  She was instructed to get rechecked at any time if change worsening symptoms in interim.  I see no indication for following serial quantitative hCGs based on confirmed intrauterine pregnancy with cardiac activity.  The patient indicates she is comfortable with this explanation disposition.    FINAL IMPRESSION  1. Threatened miscarriage in early pregnancy    2. 9 weeks gestation of pregnancy        PLAN  1.  Appropriate discharge instructions given  2.  Follow-up with OB/GYN  3.  Recheck if change worsening symptoms, as discussed    Electronically signed by: Guy G Gansert, M.D., 2020 4:08 PM

## 2020-09-02 NOTE — ED TRIAGE NOTES
30 y/o female ambulatory to triage with c/o onset of vaginal bleeding today while at home. Pt states the blood was bright red but did not fill the toilet bowl, she has a pad on now but states she can feel that the bleeding has continued. Pt had her 1st OB appointment last week to confirm IUP. She denies pain at this time.

## 2020-09-03 NOTE — ED NOTES
The patient has been provided with discharge education and information.  The patient was also provided with instructions on follow up care and return precautions.  The patient verbalizes understanding of discharge instructions, follow up care, and return precautions.  All questions have been answered.    No adverse signs from rhogam noted.

## 2020-09-03 NOTE — DISCHARGE INSTRUCTIONS
1.  Pelvic rest  2.  Follow-up closely with your OB/GYN physician next week  3.  Recheck immediately if any increased bleeding pain passage of large clots or tissue

## 2020-09-08 ENCOUNTER — GYNECOLOGY VISIT (OUTPATIENT)
Dept: OBGYN | Facility: CLINIC | Age: 30
End: 2020-09-08
Payer: COMMERCIAL

## 2020-09-08 VITALS — SYSTOLIC BLOOD PRESSURE: 143 MMHG | WEIGHT: 172 LBS | DIASTOLIC BLOOD PRESSURE: 103 MMHG | BODY MASS INDEX: 29.52 KG/M2

## 2020-09-08 DIAGNOSIS — O41.8X10 SUBCHORIONIC HEMATOMA IN FIRST TRIMESTER, SINGLE OR UNSPECIFIED FETUS: ICD-10-CM

## 2020-09-08 DIAGNOSIS — Z3A.11 11 WEEKS GESTATION OF PREGNANCY: ICD-10-CM

## 2020-09-08 DIAGNOSIS — O10.911 CHRONIC HYPERTENSION COMPLICATING OR REASON FOR CARE DURING PREGNANCY, FIRST TRIMESTER: ICD-10-CM

## 2020-09-08 DIAGNOSIS — O46.8X1 SUBCHORIONIC HEMATOMA IN FIRST TRIMESTER, SINGLE OR UNSPECIFIED FETUS: ICD-10-CM

## 2020-09-08 PROCEDURE — 99213 OFFICE O/P EST LOW 20 MIN: CPT | Performed by: OBSTETRICS & GYNECOLOGY

## 2020-09-08 ASSESSMENT — FIBROSIS 4 INDEX: FIB4 SCORE: 0.24

## 2020-09-08 NOTE — PROGRESS NOTES
Chief complaint: Vaginal bleeding    S: 29-year-old  1 para 0 11 weeks today gestational age, presents for follow-up of being seen in the emergency room secondary to vaginal bleeding.  Patient reports she had a large gush of bright red blood last Wednesday.  Was seen the emergency room.  Positive fetal heart tones noted.  Patient reports some old blood for the next few days.  No further episodes of bright red blood.  Possible subchorionic hematoma noted on ultrasound examination in the emergency room.    Questions answered.    O: /103   Wt 78 kg (172 lb)   LMP 2020 (Exact Date)   BMI 29.52 kg/m²   Patients' weight gain, fluid intake and exercise level discussed.  Vitals, fundal height , fetal position, and FHR reviewed on flowsheet    Lab:  Recent Results (from the past 336 hour(s))   POCT Pregnancy    Collection Time: 20 10:32 AM   Result Value Ref Range    POC Urine Pregnancy Test POSITIVE Negative    Internal Control Positive Positive     Internal Control Negative Negative    URINALYSIS,CULTURE IF INDICATED    Collection Time: 20  2:20 PM    Specimen: Urine   Result Value Ref Range    Color Yellow     Character Cloudy (A)     Specific Gravity 1.013 <1.035    Ph 7.0 5.0 - 8.0    Glucose Negative Negative mg/dL    Ketones Negative Negative mg/dL    Protein Negative Negative mg/dL    Bilirubin Negative Negative    Urobilinogen, Urine 0.2 Negative    Nitrite Negative Negative    Leukocyte Esterase Negative Negative    Occult Blood Moderate (A) Negative    Micro Urine Req Microscopic    URINE MICROSCOPIC (W/UA)    Collection Time: 20  2:20 PM   Result Value Ref Range    WBC 0-2 /hpf    RBC  (A) /hpf    Bacteria Negative None /hpf    Epithelial Cells Negative /hpf    Hyaline Cast 0-2 /lpf   CBC WITH DIFFERENTIAL    Collection Time: 20  2:31 PM   Result Value Ref Range    WBC 11.8 (H) 4.8 - 10.8 K/uL    RBC 4.19 (L) 4.20 - 5.40 M/uL    Hemoglobin 13.0 12.0 - 16.0 g/dL     Hematocrit 38.1 37.0 - 47.0 %    MCV 90.9 81.4 - 97.8 fL    MCH 31.0 27.0 - 33.0 pg    MCHC 34.1 33.6 - 35.0 g/dL    RDW 41.4 35.9 - 50.0 fL    Platelet Count 306 164 - 446 K/uL    MPV 9.8 9.0 - 12.9 fL    Neutrophils-Polys 70.50 44.00 - 72.00 %    Lymphocytes 21.00 (L) 22.00 - 41.00 %    Monocytes 6.50 0.00 - 13.40 %    Eosinophils 1.40 0.00 - 6.90 %    Basophils 0.30 0.00 - 1.80 %    Immature Granulocytes 0.30 0.00 - 0.90 %    Nucleated RBC 0.00 /100 WBC    Neutrophils (Absolute) 8.31 (H) 2.00 - 7.15 K/uL    Lymphs (Absolute) 2.48 1.00 - 4.80 K/uL    Monos (Absolute) 0.77 0.00 - 0.85 K/uL    Eos (Absolute) 0.16 0.00 - 0.51 K/uL    Baso (Absolute) 0.04 0.00 - 0.12 K/uL    Immature Granulocytes (abs) 0.04 0.00 - 0.11 K/uL    NRBC (Absolute) 0.00 K/uL   COMP METABOLIC PANEL    Collection Time: 09/02/20  2:31 PM   Result Value Ref Range    Sodium 137 135 - 145 mmol/L    Potassium 3.5 (L) 3.6 - 5.5 mmol/L    Chloride 104 96 - 112 mmol/L    Co2 22 20 - 33 mmol/L    Anion Gap 11.0 7.0 - 16.0    Glucose 96 65 - 99 mg/dL    Bun 10 8 - 22 mg/dL    Creatinine 0.39 (L) 0.50 - 1.40 mg/dL    Calcium 8.8 8.5 - 10.5 mg/dL    AST(SGOT) 13 12 - 45 U/L    ALT(SGPT) 29 2 - 50 U/L    Alkaline Phosphatase 32 30 - 99 U/L    Total Bilirubin 0.3 0.1 - 1.5 mg/dL    Albumin 3.8 3.2 - 4.9 g/dL    Total Protein 6.4 6.0 - 8.2 g/dL    Globulin 2.6 1.9 - 3.5 g/dL    A-G Ratio 1.5 g/dL   RH TYPE FOR RHOGAM FROM E.D.    Collection Time: 09/02/20  2:31 PM   Result Value Ref Range    Emergency Department Rh Typing NEG     Number Of Rh Doses Indicated 1    HCG QUANTITATIVE    Collection Time: 09/02/20  2:31 PM   Result Value Ref Range    Bhcg 56827.0 (H) 0.0 - 5.0 mIU/mL   ESTIMATED GFR    Collection Time: 09/02/20  2:31 PM   Result Value Ref Range    GFR If African American >60 >60 mL/min/1.73 m 2    GFR If Non African American >60 >60 mL/min/1.73 m 2   ACTION: RH IMMUNE GLOBULIN    Collection Time: 09/02/20  2:31 PM   Result Value Ref Range     Action  Rh Immune Globulin [id number]       issued       1 Syrng        A/P:  29 y.o.  at 11w2d presents for routine obstetric follow-up.  Size equals dates and/or scan    Bedside ultrasound today shows single intrauterine pregnancy, fetal heart tones 175 bpm.  11 weeks and 2 days gestational age by crown-rump length today.  Small subchorionic hematoma noted.  Small amount of blood noted in the cervix.  No cervical dilation seen.    Patient continues to have elevated blood pressure at this time, likely chronic hypertension.  In addition to new OB labs, will also order baseline kidney function labs.    Monitor blood pressure, may need treatment    Precautions discussed with patient    Patient interested in NIPT, pros and cons discussed with patient.  Order given    1.  Continue prenatal vitamins.  2.  Follow-up in 3 weeks.

## 2020-09-09 ENCOUNTER — HOSPITAL ENCOUNTER (OUTPATIENT)
Dept: LAB | Facility: MEDICAL CENTER | Age: 30
End: 2020-09-09
Attending: OBSTETRICS & GYNECOLOGY
Payer: COMMERCIAL

## 2020-09-09 DIAGNOSIS — Z3A.11 11 WEEKS GESTATION OF PREGNANCY: ICD-10-CM

## 2020-09-09 PROCEDURE — 81420 FETAL CHRMOML ANEUPLOIDY: CPT

## 2020-09-09 PROCEDURE — 36415 COLL VENOUS BLD VENIPUNCTURE: CPT

## 2020-09-09 PROCEDURE — 81422 FETAL CHRMOML MICRODELTJ: CPT

## 2020-09-19 ENCOUNTER — APPOINTMENT (OUTPATIENT)
Dept: RADIOLOGY | Facility: MEDICAL CENTER | Age: 30
End: 2020-09-19
Attending: EMERGENCY MEDICINE
Payer: COMMERCIAL

## 2020-09-19 ENCOUNTER — HOSPITAL ENCOUNTER (EMERGENCY)
Facility: MEDICAL CENTER | Age: 30
End: 2020-09-20
Attending: EMERGENCY MEDICINE | Admitting: EMERGENCY MEDICINE
Payer: COMMERCIAL

## 2020-09-19 DIAGNOSIS — O20.0 THREATENED MISCARRIAGE IN EARLY PREGNANCY: ICD-10-CM

## 2020-09-19 DIAGNOSIS — Z3A.13 13 WEEKS GESTATION OF PREGNANCY: ICD-10-CM

## 2020-09-19 DIAGNOSIS — O44.01 PLACENTA PREVIA IN FIRST TRIMESTER: ICD-10-CM

## 2020-09-19 LAB
ACTION RH IMMUNE GLOB 8505RHG: NORMAL
ALBUMIN SERPL BCP-MCNC: 3.8 G/DL (ref 3.2–4.9)
ALBUMIN/GLOB SERPL: 1.5 G/DL
ALP SERPL-CCNC: 39 U/L (ref 30–99)
ALT SERPL-CCNC: 19 U/L (ref 2–50)
ANION GAP SERPL CALC-SCNC: 13 MMOL/L (ref 7–16)
APPEARANCE UR: CLEAR
AST SERPL-CCNC: 15 U/L (ref 12–45)
B-HCG SERPL-ACNC: ABNORMAL MIU/ML (ref 0–5)
BACTERIA #/AREA URNS HPF: NEGATIVE /HPF
BACTERIA GENITAL QL WET PREP: NORMAL
BASOPHILS # BLD AUTO: 0.3 % (ref 0–1.8)
BASOPHILS # BLD: 0.03 K/UL (ref 0–0.12)
BILIRUB SERPL-MCNC: 0.4 MG/DL (ref 0.1–1.5)
BILIRUB UR QL STRIP.AUTO: NEGATIVE
BUN SERPL-MCNC: 9 MG/DL (ref 8–22)
CALCIUM SERPL-MCNC: 8.8 MG/DL (ref 8.5–10.5)
CHLORIDE SERPL-SCNC: 102 MMOL/L (ref 96–112)
CO2 SERPL-SCNC: 21 MMOL/L (ref 20–33)
COLOR UR: YELLOW
CREAT SERPL-MCNC: 0.4 MG/DL (ref 0.5–1.4)
EOSINOPHIL # BLD AUTO: 0.14 K/UL (ref 0–0.51)
EOSINOPHIL NFR BLD: 1.4 % (ref 0–6.9)
EPI CELLS #/AREA URNS HPF: NEGATIVE /HPF
ERYTHROCYTE [DISTWIDTH] IN BLOOD BY AUTOMATED COUNT: 44.6 FL (ref 35.9–50)
GLOBULIN SER CALC-MCNC: 2.5 G/DL (ref 1.9–3.5)
GLUCOSE SERPL-MCNC: 76 MG/DL (ref 65–99)
GLUCOSE UR STRIP.AUTO-MCNC: NEGATIVE MG/DL
HCT VFR BLD AUTO: 39.8 % (ref 37–47)
HGB BLD-MCNC: 13.5 G/DL (ref 12–16)
HYALINE CASTS #/AREA URNS LPF: NORMAL /LPF
IMM GRANULOCYTES # BLD AUTO: 0.04 K/UL (ref 0–0.11)
IMM GRANULOCYTES NFR BLD AUTO: 0.4 % (ref 0–0.9)
KETONES UR STRIP.AUTO-MCNC: NEGATIVE MG/DL
LEUKOCYTE ESTERASE UR QL STRIP.AUTO: NEGATIVE
LIPASE SERPL-CCNC: 41 U/L (ref 11–82)
LYMPHOCYTES # BLD AUTO: 2.36 K/UL (ref 1–4.8)
LYMPHOCYTES NFR BLD: 23.8 % (ref 22–41)
MCH RBC QN AUTO: 31.5 PG (ref 27–33)
MCHC RBC AUTO-ENTMCNC: 33.9 G/DL (ref 33.6–35)
MCV RBC AUTO: 93 FL (ref 81.4–97.8)
MICRO URNS: ABNORMAL
MONOCYTES # BLD AUTO: 0.69 K/UL (ref 0–0.85)
MONOCYTES NFR BLD AUTO: 7 % (ref 0–13.4)
NEUTROPHILS # BLD AUTO: 6.65 K/UL (ref 2–7.15)
NEUTROPHILS NFR BLD: 67.1 % (ref 44–72)
NITRITE UR QL STRIP.AUTO: NEGATIVE
NRBC # BLD AUTO: 0 K/UL
NRBC BLD-RTO: 0 /100 WBC
PH UR STRIP.AUTO: 6.5 [PH] (ref 5–8)
PLATELET # BLD AUTO: 320 K/UL (ref 164–446)
PMV BLD AUTO: 9.5 FL (ref 9–12.9)
POTASSIUM SERPL-SCNC: 3.7 MMOL/L (ref 3.6–5.5)
PROT SERPL-MCNC: 6.3 G/DL (ref 6–8.2)
PROT UR QL STRIP: NEGATIVE MG/DL
RBC # BLD AUTO: 4.28 M/UL (ref 4.2–5.4)
RBC # URNS HPF: NORMAL /HPF
RBC UR QL AUTO: ABNORMAL
SIGNIFICANT IND 70042: NORMAL
SITE SITE: NORMAL
SODIUM SERPL-SCNC: 136 MMOL/L (ref 135–145)
SOURCE SOURCE: NORMAL
SP GR UR STRIP.AUTO: 1.01
UROBILINOGEN UR STRIP.AUTO-MCNC: 0.2 MG/DL
WBC # BLD AUTO: 9.9 K/UL (ref 4.8–10.8)
WBC #/AREA URNS HPF: NORMAL /HPF

## 2020-09-19 PROCEDURE — 96374 THER/PROPH/DIAG INJ IV PUSH: CPT

## 2020-09-19 PROCEDURE — 83690 ASSAY OF LIPASE: CPT

## 2020-09-19 PROCEDURE — 80053 COMPREHEN METABOLIC PANEL: CPT

## 2020-09-19 PROCEDURE — 87591 N.GONORRHOEAE DNA AMP PROB: CPT

## 2020-09-19 PROCEDURE — 87491 CHLMYD TRACH DNA AMP PROBE: CPT

## 2020-09-19 PROCEDURE — 99284 EMERGENCY DEPT VISIT MOD MDM: CPT

## 2020-09-19 PROCEDURE — 84702 CHORIONIC GONADOTROPIN TEST: CPT

## 2020-09-19 PROCEDURE — 85025 COMPLETE CBC W/AUTO DIFF WBC: CPT

## 2020-09-19 PROCEDURE — 81001 URINALYSIS AUTO W/SCOPE: CPT

## 2020-09-19 PROCEDURE — 76817 TRANSVAGINAL US OBSTETRIC: CPT

## 2020-09-19 ASSESSMENT — ENCOUNTER SYMPTOMS
DIZZINESS: 0
CHILLS: 0
VOMITING: 0
NAUSEA: 0
HEADACHES: 0
FEVER: 0
COUGH: 0

## 2020-09-19 ASSESSMENT — FIBROSIS 4 INDEX: FIB4 SCORE: 0.24

## 2020-09-20 VITALS
SYSTOLIC BLOOD PRESSURE: 134 MMHG | HEIGHT: 64 IN | DIASTOLIC BLOOD PRESSURE: 92 MMHG | TEMPERATURE: 98.9 F | HEART RATE: 84 BPM | WEIGHT: 170 LBS | OXYGEN SATURATION: 100 % | RESPIRATION RATE: 17 BRPM | BODY MASS INDEX: 29.02 KG/M2

## 2020-09-20 LAB
22Q112 DEL SYND Q0669: NORMAL
ANNOTATION COMMENT IMP: NORMAL
C TRACH DNA SPEC QL NAA+PROBE: NEGATIVE
EER FET ANEU 22Q Q0672: NORMAL
FET CHR X + Y ANEUP RISK PLAS.CFDNA QN: NORMAL
FET SEX US: NORMAL
FET TS 13 RISK PLAS.CFDNA QL: NORMAL
FET TS 18 RISK PLAS.CFDNA QL: NORMAL
FET TS 21 RISK PLAS.CFDNA QL: NORMAL
FETAL FRACTION Q0204: 5.7 %
GA (DAYS): 3 D
GA (WEEKS): 11 WEEKS
MAT WEIGHT IN Q0670: 0
N GONORRHOEA DNA SPEC QL NAA+PROBE: NEGATIVE
NUMBER OF FETUSES Q0671: NORMAL
NUMBER OF RH DOSES IND 8505RD: 1
RH BLD: NORMAL
SERVICE CMNT-IMP: YES
SPECIMEN SOURCE: NORMAL
TRIPLOIDY VAN TWIN Q0202: NORMAL

## 2020-09-20 PROCEDURE — 86901 BLOOD TYPING SEROLOGIC RH(D): CPT

## 2020-09-20 NOTE — ED PROVIDER NOTES
ED Provider Note    CHIEF COMPLAINT  Chief Complaint   Patient presents with   • Vaginal Bleeding   • Pregnancy       HPI  Marcio Cunningham is a 30 y.o. G1, P0 female at 13 weeks gestation presenting to the emergency department for evaluation of vaginal bleeding.  Patient was seen here several weeks ago for the same.  At that time she was diagnosed with a subchorionic hemorrhage.  Since being seen in the emergency department she continued to have vaginal bleeding which was described as scant, old, brown blood.  She was seen by her OB/GYN immediately after her prior ER visit and instructed that if she continued to have old blood it was nothing to worry about.  Patient noted that today she has had one episode of what she believes was rather large volume bright red blood.  She denies any passage of clots or tissue.  Throughout the past several weeks she has had intermittent pelvic cramping, which she does not feel is very severe.  She denies any recent illness, change in vaginal discharge, dysuria, or hematuria.    Patient's OB/GYN is Dr. Ike Walls.     REVIEW OF SYSTEMS  Review of Systems   Constitutional: Negative for chills and fever.   Respiratory: Negative for cough.    Cardiovascular: Negative for chest pain.   Gastrointestinal: Negative for nausea and vomiting.   Genitourinary: Negative for dysuria.        + vaginal bleeding, pelvic cramping   Neurological: Negative for dizziness and headaches.   Endo/Heme/Allergies: Negative.    All other systems reviewed and are negative.      PAST MEDICAL HISTORY   has a past medical history of Asthma, Clotting disorder (HCC), History of pulmonary embolism (8/27/2020), Hypertension, and Migraine.    SOCIAL HISTORY  Social History     Tobacco Use   • Smoking status: Never Smoker   • Smokeless tobacco: Never Used   Substance and Sexual Activity   • Alcohol use: Not Currently     Binge frequency: Never   • Drug use: Never   • Sexual activity: Yes     Partners: Male     " Birth control/protection: Pill     Comment:  and planned pregnancy. FOB is involved and supportive.       SURGICAL HISTORY  patient denies any surgical history    CURRENT MEDICATIONS  Home Medications    **Home medications have not yet been reviewed for this encounter**         ALLERGIES  No Known Allergies    PHYSICAL EXAM  VITAL SIGNS: /94   Pulse 96   Temp 37.3 °C (99.1 °F) (Temporal)   Resp 16   Ht 1.626 m (5' 4\")   Wt 77.1 kg (170 lb)   LMP 06/26/2020 (Exact Date)   SpO2 98%   BMI 29.18 kg/m²    Pulse ox interpretation: I interpret this pulse ox as normal.    Physical Exam   Constitutional: She is oriented to person, place, and time and well-developed, well-nourished, and in no distress.   HENT:   Head: Normocephalic and atraumatic.   Mouth/Throat: Oropharynx is clear and moist.   Eyes: Conjunctivae are normal.   Neck: Normal range of motion. Neck supple.   Cardiovascular: Normal rate, regular rhythm and intact distal pulses.   Pulmonary/Chest: Effort normal and breath sounds normal. No respiratory distress. She has no rales.   Abdominal: Soft. She exhibits no distension. There is no abdominal tenderness.   Genitourinary:    Right adnexa and left adnexa normal.   Uterus is enlarged (gravid). Uterus is not tender. Cervix exhibits no motion tenderness and no tenderness.    Genitourinary Comments: Cervical os with small amount of tissue/clot present. Scant bright red blood in the vaginal vault. Os is closed on bimanual exam.     Musculoskeletal: Normal range of motion.         General: No edema.   Neurological: She is alert and oriented to person, place, and time. GCS score is 15.   Skin: Skin is warm and dry.   Psychiatric: Affect and judgment normal.   Nursing note and vitals reviewed.        DIAGNOSTIC STUDIES  Results for orders placed or performed during the hospital encounter of 09/19/20   CBC WITH DIFFERENTIAL   Result Value Ref Range    WBC 9.9 4.8 - 10.8 K/uL    RBC 4.28 4.20 - 5.40 " M/uL    Hemoglobin 13.5 12.0 - 16.0 g/dL    Hematocrit 39.8 37.0 - 47.0 %    MCV 93.0 81.4 - 97.8 fL    MCH 31.5 27.0 - 33.0 pg    MCHC 33.9 33.6 - 35.0 g/dL    RDW 44.6 35.9 - 50.0 fL    Platelet Count 320 164 - 446 K/uL    MPV 9.5 9.0 - 12.9 fL    Neutrophils-Polys 67.10 44.00 - 72.00 %    Lymphocytes 23.80 22.00 - 41.00 %    Monocytes 7.00 0.00 - 13.40 %    Eosinophils 1.40 0.00 - 6.90 %    Basophils 0.30 0.00 - 1.80 %    Immature Granulocytes 0.40 0.00 - 0.90 %    Nucleated RBC 0.00 /100 WBC    Neutrophils (Absolute) 6.65 2.00 - 7.15 K/uL    Lymphs (Absolute) 2.36 1.00 - 4.80 K/uL    Monos (Absolute) 0.69 0.00 - 0.85 K/uL    Eos (Absolute) 0.14 0.00 - 0.51 K/uL    Baso (Absolute) 0.03 0.00 - 0.12 K/uL    Immature Granulocytes (abs) 0.04 0.00 - 0.11 K/uL    NRBC (Absolute) 0.00 K/uL   COMP METABOLIC PANEL   Result Value Ref Range    Sodium 136 135 - 145 mmol/L    Potassium 3.7 3.6 - 5.5 mmol/L    Chloride 102 96 - 112 mmol/L    Co2 21 20 - 33 mmol/L    Anion Gap 13.0 7.0 - 16.0    Glucose 76 65 - 99 mg/dL    Bun 9 8 - 22 mg/dL    Creatinine 0.40 (L) 0.50 - 1.40 mg/dL    Calcium 8.8 8.5 - 10.5 mg/dL    AST(SGOT) 15 12 - 45 U/L    ALT(SGPT) 19 2 - 50 U/L    Alkaline Phosphatase 39 30 - 99 U/L    Total Bilirubin 0.4 0.1 - 1.5 mg/dL    Albumin 3.8 3.2 - 4.9 g/dL    Total Protein 6.3 6.0 - 8.2 g/dL    Globulin 2.5 1.9 - 3.5 g/dL    A-G Ratio 1.5 g/dL   LIPASE   Result Value Ref Range    Lipase 41 11 - 82 U/L   HCG QUANTITATIVE   Result Value Ref Range    Bhcg 14684.0 (H) 0.0 - 5.0 mIU/mL   URINALYSIS,CULTURE IF INDICATED    Specimen: Blood   Result Value Ref Range    Color Yellow     Character Clear     Specific Gravity 1.010 <1.035    Ph 6.5 5.0 - 8.0    Glucose Negative Negative mg/dL    Ketones Negative Negative mg/dL    Protein Negative Negative mg/dL    Bilirubin Negative Negative    Urobilinogen, Urine 0.2 Negative    Nitrite Negative Negative    Leukocyte Esterase Negative Negative    Occult Blood Small (A)  Negative    Micro Urine Req Microscopic    ESTIMATED GFR   Result Value Ref Range    GFR If African American >60 >60 mL/min/1.73 m 2    GFR If Non African American >60 >60 mL/min/1.73 m 2   RH TYPE FOR RHOGAM FROM E.D.   Result Value Ref Range    Emergency Department Rh Typing NEG    WET PREP    Specimen: Vaginal; Genital   Result Value Ref Range    Significant Indicator NEG     Source GEN     Site VAGINAL     Wet Prep For Parasites       Moderate WBCs seen.  Few clue cells seen.  No motile Trichomonas seen.  No yeast.     CHLAMYDIA & GC BY PCR    Specimen: Genital   Result Value Ref Range    Source Other    URINE MICROSCOPIC (W/UA)   Result Value Ref Range    WBC 0-2 /hpf    RBC 0-2 /hpf    Bacteria Negative None /hpf    Epithelial Cells Negative /hpf    Hyaline Cast 0-2 /lpf   ACTION: RH IMMUNE GLOBULIN   Result Value Ref Range    Action  Rh Immune Globulin [id number]       issued       1 Syrng        US-OB 1ST TRIMESTER WITH TRANSVAGINAL (COMBO)   Final Result      1.  Viable single intrauterine gestation of an estimated gestational age of 13 weeks zero days.   2.  Complete placenta previa..   3.  Subchorionic hemorrhage. See comments above.            COURSE & MEDICAL DECISION MAKING  Pertinent Labs & Imaging studies reviewed. (See chart for details)  I verified that the patient was wearing a mask and I was wearing appropriate PPE every time I entered the room. The patient's mask was on the patient at all times during my encounter except for a brief view of the oropharynx.     30-year-old G1, P0 at 13 weeks gestation presents emergency department for evaluation of vaginal bleeding.  On my examination, patient was well-appearing with normal vital signs.  Pelvic exam did reveal scant vaginal bleeding with a closed cervical loss.  Differential diagnosis includes but not limited to threatened , subchorionic hemorrhage, anemia, inevitable , placenta previa    IV access was obtained and laboratory  studies were drawn per triage protocol.  These were significant for an Rh- status for which the patient did receive RhoGam in the emergency department.  Patient had no significant leukocytosis, anemia, or electrolyte disturbance requiring correction.  Beta-hCG was appropriately elevated at 29,820.  Urinalysis was not concerning for infection.  And although the wet prep did show few clue cells, do not feel the patient currently has bacterial vaginosis.    Ultrasound revealed a viable single intrauterine gestation at 13 weeks and 0 days.  Patient was noted to have a complete placenta previa on ultrasound today.  She also appears to have a subchorionic hemorrhage which was present on her previous ultrasound.    Case was discussed with Dr. Fajardo (OB/GYN) who is on-call for the patient's OB.  He recommended that the patient continue pelvic rest until seen by OB.  He suspects that the bleeding was likely from the subchorionic hemorrhage, and as the patient is not having significant bleeding at present, she does not require further work-up.  I discussed these recommendations and the plan of care with the patient and her  who expressed understanding.  The patient will return for new or worsening symptoms and is stable at the time of discharge.    The patient is referred to a primary physician for blood pressure management, diabetic screening, and for all other preventative health concerns.      DISPOSITION:  Patient will be discharged home in stable condition.    FOLLOW UP:  Anita Thornton M.D.  [address] [facility] [address]  [facility] [id number] [phone]          Ike Walls M.D.  [address] [facility] [address]  [facility] [address] [phone]    Schedule an appointment as soon as possible for a visit         OUTPATIENT MEDICATIONS:  Discharge Medication List as of 9/20/2020 12:39 AM           FINAL IMPRESSION  Visit Diagnoses     ICD-10-CM   1. 13 weeks gestation of pregnancy  Z3A.13   2. Threatened miscarriage  in early pregnancy  O20.0   3. Placenta previa in first trimester  O44.01              Electronically signed by: Eileen Toledo M.D., 9/19/2020 9:16 PM

## 2020-09-20 NOTE — ED TRIAGE NOTES
"Pt states she is having bright red vaginal bleeding that started today, pt states the \"was quite a bit\" in the toilet, pt is 13 weeks pregnant, seen here 2 weeks ago for the same, pt also c/o abd pain, 1st pregnancy    Pt/staff masked and in appropriate PPE during encounter.  Pt denies fever,/travel or being in contact with anyone testing positive for Covid.  Explained pt the triage process, made pt aware to tell the RN/staff of any changes/concerns, pt verbalized understanding of process and instructions given.  Pt to ER obed.    "

## 2020-09-20 NOTE — ED NOTES
Patient verbalizes understanding of follow up and when to return to the ed.  All questions answered.  Patient discharged with family

## 2020-09-20 NOTE — ED NOTES
Patient updated on plan of care.  Waiting for OB, denies needs at this time.  Will continue to monitor

## 2020-09-30 ENCOUNTER — HOSPITAL ENCOUNTER (OUTPATIENT)
Facility: MEDICAL CENTER | Age: 30
End: 2020-09-30
Attending: OBSTETRICS & GYNECOLOGY
Payer: COMMERCIAL

## 2020-09-30 ENCOUNTER — INITIAL PRENATAL (OUTPATIENT)
Dept: OBGYN | Facility: CLINIC | Age: 30
End: 2020-09-30
Payer: COMMERCIAL

## 2020-09-30 ENCOUNTER — HOSPITAL ENCOUNTER (OUTPATIENT)
Dept: LAB | Facility: MEDICAL CENTER | Age: 30
End: 2020-09-30
Attending: OBSTETRICS & GYNECOLOGY
Payer: COMMERCIAL

## 2020-09-30 VITALS — WEIGHT: 173 LBS | SYSTOLIC BLOOD PRESSURE: 142 MMHG | BODY MASS INDEX: 29.7 KG/M2 | DIASTOLIC BLOOD PRESSURE: 103 MMHG

## 2020-09-30 DIAGNOSIS — Z3A.14 14 WEEKS GESTATION OF PREGNANCY: ICD-10-CM

## 2020-09-30 DIAGNOSIS — O10.911 CHRONIC HYPERTENSION COMPLICATING OR REASON FOR CARE DURING PREGNANCY, FIRST TRIMESTER: ICD-10-CM

## 2020-09-30 DIAGNOSIS — Z86.718 HISTORY OF DVT IN ADULTHOOD: ICD-10-CM

## 2020-09-30 DIAGNOSIS — O26.892 RH NEGATIVE STATE IN ANTEPARTUM PERIOD, SECOND TRIMESTER: ICD-10-CM

## 2020-09-30 DIAGNOSIS — O44.02 PLACENTA PREVIA ANTEPARTUM IN SECOND TRIMESTER: ICD-10-CM

## 2020-09-30 DIAGNOSIS — Z86.711 HISTORY OF PULMONARY EMBOLISM: ICD-10-CM

## 2020-09-30 DIAGNOSIS — Z67.91 RH NEGATIVE STATE IN ANTEPARTUM PERIOD, SECOND TRIMESTER: ICD-10-CM

## 2020-09-30 PROBLEM — O36.0920: Status: ACTIVE | Noted: 2020-09-30

## 2020-09-30 PROBLEM — O36.0920: Status: RESOLVED | Noted: 2020-09-30 | Resolved: 2020-09-30

## 2020-09-30 LAB
ABO GROUP BLD: ABNORMAL
APTT PPP: 30.8 SEC (ref 24.7–36)
BACTERIA #/AREA URNS HPF: NEGATIVE /HPF
BLD GP AB INVEST PLASRBC-IMP: ABNORMAL
BLD GP AB SCN SERPL QL: ABNORMAL
CREAT UR-MCNC: 57.02 MG/DL
EPI CELLS #/AREA URNS HPF: NEGATIVE /HPF
HYALINE CASTS #/AREA URNS LPF: NORMAL /LPF
PROT UR-MCNC: 5 MG/DL (ref 0–15)
PROT/CREAT UR: 88 MG/G (ref 10–107)
RBC # URNS HPF: NORMAL /HPF
RH BLD: ABNORMAL
WBC #/AREA URNS HPF: NORMAL /HPF

## 2020-09-30 PROCEDURE — 86901 BLOOD TYPING SEROLOGIC RH(D): CPT

## 2020-09-30 PROCEDURE — 86870 RBC ANTIBODY IDENTIFICATION: CPT

## 2020-09-30 PROCEDURE — 85300 ANTITHROMBIN III ACTIVITY: CPT

## 2020-09-30 PROCEDURE — 81241 F5 GENE: CPT

## 2020-09-30 PROCEDURE — 86900 BLOOD TYPING SEROLOGIC ABO: CPT

## 2020-09-30 PROCEDURE — 86762 RUBELLA ANTIBODY: CPT

## 2020-09-30 PROCEDURE — 85303 CLOT INHIBIT PROT C ACTIVITY: CPT

## 2020-09-30 PROCEDURE — 85025 COMPLETE CBC W/AUTO DIFF WBC: CPT

## 2020-09-30 PROCEDURE — 85730 THROMBOPLASTIN TIME PARTIAL: CPT

## 2020-09-30 PROCEDURE — 86850 RBC ANTIBODY SCREEN: CPT

## 2020-09-30 PROCEDURE — 59401 PR NEW OB VISIT: CPT | Performed by: OBSTETRICS & GYNECOLOGY

## 2020-09-30 PROCEDURE — 81001 URINALYSIS AUTO W/SCOPE: CPT

## 2020-09-30 PROCEDURE — 87624 HPV HI-RISK TYP POOLED RSLT: CPT

## 2020-09-30 PROCEDURE — 81240 F2 GENE: CPT

## 2020-09-30 PROCEDURE — 36415 COLL VENOUS BLD VENIPUNCTURE: CPT

## 2020-09-30 PROCEDURE — 82105 ALPHA-FETOPROTEIN SERUM: CPT

## 2020-09-30 PROCEDURE — 86780 TREPONEMA PALLIDUM: CPT

## 2020-09-30 PROCEDURE — 88175 CYTOPATH C/V AUTO FLUID REDO: CPT

## 2020-09-30 PROCEDURE — 87389 HIV-1 AG W/HIV-1&-2 AB AG IA: CPT

## 2020-09-30 PROCEDURE — 87340 HEPATITIS B SURFACE AG IA: CPT

## 2020-09-30 PROCEDURE — 85306 CLOT INHIBIT PROT S FREE: CPT

## 2020-09-30 RX ORDER — NIFEDIPINE 30 MG/1
30 TABLET, EXTENDED RELEASE ORAL DAILY
Qty: 30 TAB | Refills: 6 | Status: SHIPPED | OUTPATIENT
Start: 2020-09-30 | End: 2021-02-02 | Stop reason: SDUPTHER

## 2020-09-30 ASSESSMENT — FIBROSIS 4 INDEX: FIB4 SCORE: 0.33

## 2020-09-30 NOTE — PROGRESS NOTES
Cc: New OB visit    HPI:  The patient is a 30 y.o.  14w3d  Patient's last menstrual period was 2020 (exact date).     Pregnancy complicated by chronic hypertension, placenta previa with first trimester bleeding and history of a pulmonary embolism while on birth control pills    Patient recently seen in the emergency room secondary to vaginal bleeding.  Placenta previa noted on last ultrasound.  She is Rh- and did receive RhoGam    She presents for her new obstetric visit.    She denies fetal movement, reports vaginal bleeding, denies leakage of fluid, denies contractions.     She denies nausea/vomiting, headaches, or urinary symptoms.      OB History    Para Term  AB Living   1             SAB TAB Ectopic Molar Multiple Live Births                    # Outcome Date GA Lbr James/2nd Weight Sex Delivery Anes PTL Lv   1 Current              Past Medical History:   Diagnosis Date   • Asthma     Dx'd as child   • Clotting disorder (HCC)     Dx'd on    • History of pulmonary embolism 2020   • Hypertension    • Migraine    • Rh negative state in antepartum period, second trimester 2020     History reviewed. No pertinent surgical history.  Social History     Socioeconomic History   • Marital status:      Spouse name: Not on file   • Number of children: Not on file   • Years of education: Not on file   • Highest education level: Not on file   Occupational History   • Not on file   Social Needs   • Financial resource strain: Not on file   • Food insecurity     Worry: Not on file     Inability: Not on file   • Transportation needs     Medical: Not on file     Non-medical: Not on file   Tobacco Use   • Smoking status: Never Smoker   • Smokeless tobacco: Never Used   Substance and Sexual Activity   • Alcohol use: Not Currently     Binge frequency: Never   • Drug use: Never   • Sexual activity: Yes     Partners: Male     Birth control/protection: Pill     Comment:  and planned  pregnancy. FOB is involved and supportive.   Lifestyle   • Physical activity     Days per week: Not on file     Minutes per session: Not on file   • Stress: Not on file   Relationships   • Social connections     Talks on phone: Not on file     Gets together: Not on file     Attends [employer] service: Not on file     Active member of club or organization: Not on file     Attends meetings of clubs or organizations: Not on file     Relationship status: Not on file   • Intimate partner violence     Fear of current or ex partner: Not on file     Emotionally abused: Not on file     Physically abused: Not on file     Forced sexual activity: Not on file   Other Topics Concern   • Not on file   Social History Narrative    ** Merged History Encounter **         RN on tele floor at [employer]     Family History   Problem Relation Age of Onset   • Hypertension Mother    • Alcohol abuse Mother    • Hypertension Father    • Breast Cancer Maternal Aunt    • Hypertension Maternal Grandmother    • Diabetes Maternal Grandmother    • Hypertension Paternal Grandmother    • Diabetes Paternal Grandmother      Allergies:   Allergies as of 2020   • (No Known Allergies)       PE:    /103   Wt 78.5 kg (173 lb)     General: no apparent distress, is well developed and well nourished  Head: normocephalic, atraumatic  Neck: neck is supple, non-tender, no thyromegaly, full range of motion  CVS: regular rate and rhythm, no peripheral edema  Lungs: Normal respiratory effort. Clear to auscultation bilaterally  Abdomen: Bowel sounds positive, nondistended, soft, nontender x4, no rebound or guarding.  Female GYN: normal female external genitalia without lesionsnormal external genitalia, no erythema, no discharge, normal cervix  Skin: No rashes, or ulcers or lesions seen  Psychiatric: Patient shows appropriate affect, is alert and oriented x3, intact judgment and insight.        A/P:  30 y.o.  14w3d based upon  Patient's last menstrual  period was 06/26/2020 (exact date)..  She is here for her new obstetric visit.    1. Placenta previa antepartum in second trimester     2. 14 weeks gestation of pregnancy  PRENATAL PANEL 3+HIV+UACXI    THINPREP PAP WITH HPV    US-OB 2ND 3RD TRI COMPLETE    Influenza Vaccine Quad Injection (PF)    AFP MATERNAL SERUM ALPHA-FETOPROTEIN   3. Chronic hypertension complicating or reason for care during pregnancy, second trimester  PROTEIN/CREAT RATIO URINE   4. History of DVT in adulthood  APTT    AT-III FUNCTIONAL    FACTOR V LEIDEN MUTATION    FACTOR II DNA ANALYSIS    PROTEIN C FUNCTIONAL    PROTEIN S FUNCTIONAL   5. History of pulmonary embolism     6. Rh negative state in antepartum period, second trimester         1. Ultrasound for dates and anatomy ordered.  2.  NIPT performed, results discussed with patient.  3.  Ordered prenatal labs.  4.  NOB packet given with ACOG prenatal book.  5.  Increase water intake and encouraged healthy nutrition.  6.  Referral to MFM not needed at this time.  7.  Continue prenatal vitamins.  8.  Follow-up in 4 weeks.   9.  SAB precautions educated.    Maternal serum AFP ordered today.    In addition to new OB labs, will order protein/creatinine ratio given history of chronic hypertension.  Begin Procardia 30 XL  ASA 81 mg prophylaxis also started.    Patient has a history of a DVT while on estrogen.  Given the increased risk of DVT during pregnancy and her prior history of a pulmonary embolism, will begin Lovenox prophylaxis at this time.  Thrombophilia panel also ordered    Pap smear obtained today    Flu vaccination declined by patient

## 2020-09-30 NOTE — PROGRESS NOTES
NOB today  LMP: 06/26  Last pap:209 normal  Pharmacy confirmed  On PNV  Wants AFP  Flu vaccine declined

## 2020-10-01 DIAGNOSIS — Z3A.14 14 WEEKS GESTATION OF PREGNANCY: ICD-10-CM

## 2020-10-01 LAB
APPEARANCE UR: ABNORMAL
BASOPHILS # BLD AUTO: 0.3 % (ref 0–1.8)
BASOPHILS # BLD: 0.02 K/UL (ref 0–0.12)
BILIRUB UR QL STRIP.AUTO: NEGATIVE
COLOR UR: YELLOW
EOSINOPHIL # BLD AUTO: 0.12 K/UL (ref 0–0.51)
EOSINOPHIL NFR BLD: 1.5 % (ref 0–6.9)
ERYTHROCYTE [DISTWIDTH] IN BLOOD BY AUTOMATED COUNT: 43.9 FL (ref 35.9–50)
GLUCOSE UR STRIP.AUTO-MCNC: NEGATIVE MG/DL
HBV SURFACE AG SER QL: ABNORMAL
HCT VFR BLD AUTO: 38.3 % (ref 37–47)
HGB BLD-MCNC: 13.2 G/DL (ref 12–16)
HIV 1+2 AB+HIV1 P24 AG SERPL QL IA: NORMAL
IMM GRANULOCYTES # BLD AUTO: 0.03 K/UL (ref 0–0.11)
IMM GRANULOCYTES NFR BLD AUTO: 0.4 % (ref 0–0.9)
KETONES UR STRIP.AUTO-MCNC: NEGATIVE MG/DL
LEUKOCYTE ESTERASE UR QL STRIP.AUTO: NEGATIVE
LYMPHOCYTES # BLD AUTO: 1.67 K/UL (ref 1–4.8)
LYMPHOCYTES NFR BLD: 21.4 % (ref 22–41)
MCH RBC QN AUTO: 31.7 PG (ref 27–33)
MCHC RBC AUTO-ENTMCNC: 34.5 G/DL (ref 33.6–35)
MCV RBC AUTO: 92.1 FL (ref 81.4–97.8)
MICRO URNS: ABNORMAL
MONOCYTES # BLD AUTO: 0.48 K/UL (ref 0–0.85)
MONOCYTES NFR BLD AUTO: 6.1 % (ref 0–13.4)
NEUTROPHILS # BLD AUTO: 5.49 K/UL (ref 2–7.15)
NEUTROPHILS NFR BLD: 70.3 % (ref 44–72)
NITRITE UR QL STRIP.AUTO: NEGATIVE
NRBC # BLD AUTO: 0 K/UL
NRBC BLD-RTO: 0 /100 WBC
PH UR STRIP.AUTO: 7.5 [PH] (ref 5–8)
PLATELET # BLD AUTO: 316 K/UL (ref 164–446)
PMV BLD AUTO: 10.1 FL (ref 9–12.9)
PROT UR QL STRIP: NEGATIVE MG/DL
RBC # BLD AUTO: 4.16 M/UL (ref 4.2–5.4)
RBC UR QL AUTO: NEGATIVE
RUBV AB SER QL: 179 IU/ML
SP GR UR STRIP.AUTO: 1.01
TREPONEMA PALLIDUM IGG+IGM AB [PRESENCE] IN SERUM OR PLASMA BY IMMUNOASSAY: ABNORMAL
UROBILINOGEN UR STRIP.AUTO-MCNC: 0.2 MG/DL
WBC # BLD AUTO: 7.8 K/UL (ref 4.8–10.8)

## 2020-10-02 LAB
CYTOLOGY REG CYTOL: ABNORMAL
HPV HR 12 DNA CVX QL NAA+PROBE: POSITIVE
HPV16 DNA SPEC QL NAA+PROBE: NEGATIVE
HPV18 DNA SPEC QL NAA+PROBE: NEGATIVE
SPECIMEN SOURCE: ABNORMAL

## 2020-10-03 LAB
# FETUSES US: NORMAL
AFP MOM SERPL: 1.79
AFP SERPL-MCNC: 41 NG/ML
AGE - REPORTED: 31.5 YR
CURRENT SMOKER: NO
FAMILY MEMBER DISEASES HX: NO
GA METHOD: NORMAL
GA: NORMAL WK
IDDM PATIENT QL: NO
INTEGRATED SCN PATIENT-IMP: NORMAL
PROT C ACT/NOR PPP: 171 % (ref 83–168)
PROT S ACT/NOR PPP: 61 % (ref 57–131)
SPECIMEN DRAWN SERPL: NORMAL

## 2020-10-04 LAB — AT III ACT/NOR PPP CHRO: 90 % (ref 76–128)

## 2020-10-06 LAB
F2 C.20210G>A GENO BLD/T: NEGATIVE
F5 P.R506Q BLD/T QL: NEGATIVE

## 2020-10-13 ENCOUNTER — TELEPHONE (OUTPATIENT)
Dept: OBGYN | Facility: CLINIC | Age: 30
End: 2020-10-13

## 2020-10-13 NOTE — TELEPHONE ENCOUNTER
Pt called in stating she was given a lifting restriction note but her manager needs to know how long will she be on those restrictions. Per Dr. Pack, she will be on restrictions for remainder of pregnancy and 6 weeks post-partum. Pt notified, requesting another note. Letter created and uploaded to [employer], per pt's request

## 2020-10-29 ENCOUNTER — ROUTINE PRENATAL (OUTPATIENT)
Dept: OBGYN | Facility: CLINIC | Age: 30
End: 2020-10-29
Payer: COMMERCIAL

## 2020-10-29 VITALS — WEIGHT: 181 LBS | DIASTOLIC BLOOD PRESSURE: 84 MMHG | BODY MASS INDEX: 31.07 KG/M2 | SYSTOLIC BLOOD PRESSURE: 129 MMHG

## 2020-10-29 DIAGNOSIS — Z67.91 RH NEGATIVE STATE IN ANTEPARTUM PERIOD, SECOND TRIMESTER: ICD-10-CM

## 2020-10-29 DIAGNOSIS — G43.109 MIGRAINE WITH AURA AND WITHOUT STATUS MIGRAINOSUS, NOT INTRACTABLE: ICD-10-CM

## 2020-10-29 DIAGNOSIS — O10.911 CHRONIC HYPERTENSION COMPLICATING OR REASON FOR CARE DURING PREGNANCY, FIRST TRIMESTER: ICD-10-CM

## 2020-10-29 DIAGNOSIS — O26.892 RH NEGATIVE STATE IN ANTEPARTUM PERIOD, SECOND TRIMESTER: ICD-10-CM

## 2020-10-29 DIAGNOSIS — J45.990 EXERCISE-INDUCED ASTHMA: ICD-10-CM

## 2020-10-29 DIAGNOSIS — O09.92 HIGH-RISK PREGNANCY SUPERVISION, SECOND TRIMESTER: ICD-10-CM

## 2020-10-29 DIAGNOSIS — Z86.711 HISTORY OF PULMONARY EMBOLISM: ICD-10-CM

## 2020-10-29 DIAGNOSIS — O44.02 PLACENTA PREVIA ANTEPARTUM IN SECOND TRIMESTER: ICD-10-CM

## 2020-10-29 PROCEDURE — 90040 PR PRENATAL FOLLOW UP: CPT | Performed by: OBSTETRICS & GYNECOLOGY

## 2020-10-29 ASSESSMENT — FIBROSIS 4 INDEX: FIB4 SCORE: 0.34

## 2020-10-29 NOTE — PROGRESS NOTES
Pt here today for OB follow up  Pt states no complaints  Reports +FM  Good # [phone]  Pharmacy Confirmed.  Chaperone offered and provided.

## 2020-10-29 NOTE — PROGRESS NOTES
S: Pt presents for routine OB follow up.  Patient is doing well currently without complaints  No contractions, vaginal bleeding, or leaking fluids. Good fetal movement.  Patient has had vaginal bleeding earlier in this pregnancy which has resolved-she was diagnosed with placenta previa in early ultrasound and has anatomy ultrasound scheduled for next week.  She is on Lovenox due to history of DVT  Questions answered.    O: /84   Wt 82.1 kg (181 lb)   LMP 2020 (Exact Date)   BMI 31.07 kg/m²   Patients' weight gain, fluid intake and exercise level discussed.  Vitals, fundal height , fetal position, and FHR reviewed on flowsheet    Lab: Prenatal lab results reviewed with patient.  We discussed elevated protein C results and I recommend repeat testing in a nonpregnancy state.  Due to her history of DVT and elevated protein C, I discussed continuation of Lovenox/anticoagulation until 6 weeks postpartum and patient agrees.  I discussed placenta previa and precautions.      A/P:  30 y.o.  at 18w4d presents for routine obstetric follow-up.  Patient is doing well currently  Size equals dates   Encounter Diagnoses   Name Primary?   • High-risk pregnancy supervision, second trimester    • Rh negative state in antepartum period, second trimester-RhoGam use in pregnancy reviewed    • Placenta previa antepartum in second trimester-findings and precautions were discussed    • Chronic hypertension complicating or reason for care during pregnancy, second trimester.  Blood pressures controlled with medication.  Patient is also on baby aspirin    • History of pulmonary embolism-patient to continue Lovenox therapy    • Exercise-induced asthma-symptoms are stable.  Patient has rescue inhaler    • Migraine with aura and without status migrainosus, not intractable-symptoms are stable          1.  Continue prenatal vitamins.  2.  Begin kick counts at 28 weeks.  3.  Exercise at least 30 minutes daily.  4.  Drink at least  2 Liters of water daily  5.  Follow-up in 4 weeks.  6.  Pregnancy precautions and plan of care discussed

## 2020-11-11 ENCOUNTER — HOSPITAL ENCOUNTER (OUTPATIENT)
Dept: RADIOLOGY | Facility: MEDICAL CENTER | Age: 30
End: 2020-11-11
Attending: OBSTETRICS & GYNECOLOGY
Payer: COMMERCIAL

## 2020-11-11 DIAGNOSIS — Z3A.14 14 WEEKS GESTATION OF PREGNANCY: ICD-10-CM

## 2020-11-11 PROCEDURE — 76805 OB US >/= 14 WKS SNGL FETUS: CPT

## 2020-11-25 ENCOUNTER — ROUTINE PRENATAL (OUTPATIENT)
Dept: OBGYN | Facility: CLINIC | Age: 30
End: 2020-11-25
Payer: COMMERCIAL

## 2020-11-25 VITALS — DIASTOLIC BLOOD PRESSURE: 76 MMHG | WEIGHT: 190 LBS | BODY MASS INDEX: 32.61 KG/M2 | SYSTOLIC BLOOD PRESSURE: 116 MMHG

## 2020-11-25 DIAGNOSIS — L29.9 PRURITUS: ICD-10-CM

## 2020-11-25 DIAGNOSIS — Z34.02 ENCOUNTER FOR SUPERVISION OF NORMAL FIRST PREGNANCY, SECOND TRIMESTER: ICD-10-CM

## 2020-11-25 DIAGNOSIS — O10.911 CHRONIC HYPERTENSION COMPLICATING OR REASON FOR CARE DURING PREGNANCY, FIRST TRIMESTER: ICD-10-CM

## 2020-11-25 PROBLEM — Z3A.14 14 WEEKS GESTATION OF PREGNANCY: Status: RESOLVED | Noted: 2020-09-30 | Resolved: 2020-11-25

## 2020-11-25 PROCEDURE — 90686 IIV4 VACC NO PRSV 0.5 ML IM: CPT | Performed by: ADVANCED PRACTICE MIDWIFE

## 2020-11-25 PROCEDURE — 90040 PR PRENATAL FOLLOW UP: CPT | Performed by: ADVANCED PRACTICE MIDWIFE

## 2020-11-25 PROCEDURE — 90471 IMMUNIZATION ADMIN: CPT | Performed by: ADVANCED PRACTICE MIDWIFE

## 2020-11-25 ASSESSMENT — FIBROSIS 4 INDEX: FIB4 SCORE: 0.34

## 2020-11-25 NOTE — PROGRESS NOTES
Pt here today for OB follow up  Pt states no VB or LOF   Reports +FM  Good # [phone]   Pharmacy Confirmed.  Flu shot Today

## 2020-11-25 NOTE — PROGRESS NOTES
Girl: Katerina    Has patient been referred to outside provider?   no    Records available on chart?   n/a    SUBJECTIVE:  Pt is a 30 y.o.   at 22w3d  gestation. Presents today for follow-up prenatal care. Has not been seen in ER or L & D since last visit. Reports good  fetal movement.     Leaking of fluid?       no    Dysuria?       no    Headaches?      No; migraines have resolved    N/V?          no    Cramping/contractions?      no    Patient did stop lovenox. Patient continuing to take baby aspirin. Her PE may have been related to birth control 10 years ago.    Last asthma attack has been >2 years. Not using inhaler but has noticed that she is having some difficulty catching breath.     Having intermittent rashes on her     OBJECTIVE:   /76   Wt 86.2 kg (190 lb)   LMP 2020 (Exact Date)   BMI 32.61 kg/m²   Patients' weight gain, fluid intake and exercise level discussed.  Vitals, fundal height , fetal position, and FHR reviewed on flowsheet    Lab:No results found for this or any previous visit (from the past 336 hour(s)).    ASSESSMENT/ PLAN:   - IUP at 22w3d    - S > D   -   Patient Active Problem List    Diagnosis Date Noted   • High-risk pregnancy supervision, second trimester 10/29/2020   • Placenta previa antepartum in second trimester 2020   • Rh negative state in antepartum period, second trimester 2020   • Chronic hypertension complicating or reason for care during pregnancy, second trimester 2020   • Subchorionic hematoma in first trimester 2020   • History of pulmonary embolism 2020   • Migraine with aura and without status migrainosus, not intractable 2020   • Exercise-induced asthma 2020         Chronic hypertension  Continue ASA at current. No 24 hour urine protein completed, will add today. Explained this to patient. Continue procardia at current. Consider repeat PIH labs at 28 weeks if appropriate.     Rh negative  Received Rhogham x 2 for  early bleeding.     Pruritis  Discussed that pictures appear to be chemical irritant of some sort. However, not unreasonable to check bile acids. Lab order provided to patient.     Flu: today    - S/sx pregnancy and labor warning signs vs general discomforts discussed  - Fetal movements and kick counts reviewed. Adequate hydration reinforced.  - Did discuss current COVID policies related to outpatient and inpatient visits.   - Anticipatory guidance provided.   - RTC in 4 weeks for routine prenatal care.

## 2020-11-28 ENCOUNTER — HOSPITAL ENCOUNTER (OUTPATIENT)
Dept: LAB | Facility: MEDICAL CENTER | Age: 30
End: 2020-11-28
Attending: ADVANCED PRACTICE MIDWIFE
Payer: COMMERCIAL

## 2020-11-28 DIAGNOSIS — L29.9 PRURITUS: ICD-10-CM

## 2020-11-28 PROCEDURE — 36415 COLL VENOUS BLD VENIPUNCTURE: CPT

## 2020-11-28 PROCEDURE — 82239 BILE ACIDS TOTAL: CPT

## 2020-11-30 LAB — BILE AC SERPL-SCNC: 1 UMOL/L (ref 0–10)

## 2020-12-12 ENCOUNTER — HOSPITAL ENCOUNTER (OUTPATIENT)
Facility: MEDICAL CENTER | Age: 30
End: 2020-12-12
Attending: ADVANCED PRACTICE MIDWIFE
Payer: COMMERCIAL

## 2020-12-12 DIAGNOSIS — O10.911 CHRONIC HYPERTENSION COMPLICATING OR REASON FOR CARE DURING PREGNANCY, FIRST TRIMESTER: ICD-10-CM

## 2020-12-12 LAB
PROT 24H UR-MCNC: 122 MG/24 HR (ref 30–150)
PROT 24H UR-MRATE: 4 MG/DL (ref 0–15)
SPECIMEN VOL UR: 3050 ML

## 2020-12-12 PROCEDURE — 84156 ASSAY OF PROTEIN URINE: CPT

## 2020-12-12 PROCEDURE — 81050 URINALYSIS VOLUME MEASURE: CPT

## 2020-12-16 ENCOUNTER — HOSPITAL ENCOUNTER (OUTPATIENT)
Dept: LAB | Facility: MEDICAL CENTER | Age: 30
End: 2020-12-16
Attending: PREVENTIVE MEDICINE
Payer: COMMERCIAL

## 2020-12-16 ENCOUNTER — HOSPITAL ENCOUNTER (OUTPATIENT)
Facility: MEDICAL CENTER | Age: 30
End: 2020-12-16
Attending: PREVENTIVE MEDICINE
Payer: COMMERCIAL

## 2020-12-16 ENCOUNTER — EH NON-PROVIDER (OUTPATIENT)
Dept: OCCUPATIONAL MEDICINE | Facility: CLINIC | Age: 30
End: 2020-12-16

## 2020-12-16 DIAGNOSIS — Z11.59 ENCOUNTER FOR SCREENING FOR OTHER VIRAL DISEASES: ICD-10-CM

## 2020-12-16 DIAGNOSIS — Z23 NEED FOR VACCINATION: ICD-10-CM

## 2020-12-16 PROCEDURE — U0003 INFECTIOUS AGENT DETECTION BY NUCLEIC ACID (DNA OR RNA); SEVERE ACUTE RESPIRATORY SYNDROME CORONAVIRUS 2 (SARS-COV-2) (CORONAVIRUS DISEASE [COVID-19]), AMPLIFIED PROBE TECHNIQUE, MAKING USE OF HIGH THROUGHPUT TECHNOLOGIES AS DESCRIBED BY CMS-2020-01-R: HCPCS

## 2020-12-16 PROCEDURE — U0003 INFECTIOUS AGENT DETECTION BY NUCLEIC ACID (DNA OR RNA); SEVERE ACUTE RESPIRATORY SYNDROME CORONAVIRUS 2 (SARS-COV-2) (CORONAVIRUS DISEASE [COVID-19]), AMPLIFIED PROBE TECHNIQUE, MAKING USE OF HIGH THROUGHPUT TECHNOLOGIES AS DESCRIBED BY CMS-2020-01-R: HCPCS | Performed by: PREVENTIVE MEDICINE

## 2020-12-17 LAB
COVID ORDER STATUS COVID19: NORMAL
SARS-COV-2 RNA RESP QL NAA+PROBE: DETECTED
SPECIMEN SOURCE: ABNORMAL

## 2020-12-18 ENCOUNTER — TELEPHONE (OUTPATIENT)
Dept: OCCUPATIONAL MEDICINE | Facility: CLINIC | Age: 30
End: 2020-12-18

## 2020-12-18 NOTE — TELEPHONE ENCOUNTER
Notified pt. of POSITIVE COVID-19 test. Advised pt. not to return to work and to call employee screening line at [phone] for further instructions.

## 2021-01-04 ENCOUNTER — ROUTINE PRENATAL (OUTPATIENT)
Dept: OBGYN | Facility: CLINIC | Age: 31
End: 2021-01-04
Payer: COMMERCIAL

## 2021-01-04 VITALS — SYSTOLIC BLOOD PRESSURE: 148 MMHG | BODY MASS INDEX: 33.13 KG/M2 | WEIGHT: 193 LBS | DIASTOLIC BLOOD PRESSURE: 89 MMHG

## 2021-01-04 DIAGNOSIS — Z67.91 RH NEGATIVE STATE IN ANTEPARTUM PERIOD, SECOND TRIMESTER: ICD-10-CM

## 2021-01-04 DIAGNOSIS — O26.892 RH NEGATIVE STATE IN ANTEPARTUM PERIOD, SECOND TRIMESTER: ICD-10-CM

## 2021-01-04 DIAGNOSIS — Z86.711 HISTORY OF PULMONARY EMBOLISM: ICD-10-CM

## 2021-01-04 DIAGNOSIS — R73.9 ELEVATED BLOOD SUGAR LEVEL: ICD-10-CM

## 2021-01-04 DIAGNOSIS — Z34.02 ENCOUNTER FOR SUPERVISION OF NORMAL FIRST PREGNANCY, SECOND TRIMESTER: ICD-10-CM

## 2021-01-04 DIAGNOSIS — O10.911 CHRONIC HYPERTENSION COMPLICATING OR REASON FOR CARE DURING PREGNANCY, FIRST TRIMESTER: ICD-10-CM

## 2021-01-04 PROBLEM — O46.8X1 SUBCHORIONIC HEMATOMA IN FIRST TRIMESTER: Status: RESOLVED | Noted: 2020-09-08 | Resolved: 2021-01-04

## 2021-01-04 PROBLEM — O41.8X10 SUBCHORIONIC HEMATOMA IN FIRST TRIMESTER: Status: RESOLVED | Noted: 2020-09-08 | Resolved: 2021-01-04

## 2021-01-04 PROBLEM — O44.02 PLACENTA PREVIA ANTEPARTUM IN SECOND TRIMESTER: Status: RESOLVED | Noted: 2020-09-30 | Resolved: 2021-01-04

## 2021-01-04 PROCEDURE — 96372 THER/PROPH/DIAG INJ SC/IM: CPT | Performed by: OBSTETRICS & GYNECOLOGY

## 2021-01-04 PROCEDURE — 90471 IMMUNIZATION ADMIN: CPT | Performed by: OBSTETRICS & GYNECOLOGY

## 2021-01-04 PROCEDURE — 90040 PR PRENATAL FOLLOW UP: CPT | Performed by: OBSTETRICS & GYNECOLOGY

## 2021-01-04 PROCEDURE — 90715 TDAP VACCINE 7 YRS/> IM: CPT | Performed by: OBSTETRICS & GYNECOLOGY

## 2021-01-04 ASSESSMENT — FIBROSIS 4 INDEX: FIB4 SCORE: 0.34

## 2021-01-04 NOTE — Clinical Note
This patient was told she should not come to the office because she was covid positive.  Can someone tell me why this keeps happening? She is very high risk and now was not seen for 6 weeks

## 2021-01-04 NOTE — PROGRESS NOTES
Pt here today for OB follow up  Pt states no complaints  Reports +FM  Good # [phone]  Pharmacy Confirmed.  Chaperone offered and provided.   PANKAJ sheet instructions given  Pt desires Tdap  3rd tri labs ordered  Tdap vaccine given. Right Deltoid. VIS given and screening check list reviewed with pt. Vaccine verified by AB

## 2021-01-04 NOTE — PROGRESS NOTES
S: Pt presents for routine OB follow up.  No contractions, vaginal bleeding, or leaking fluids. Good fetal movement.    Questions answered.    O: /89   Wt 87.5 kg (193 lb)   LMP 2020 (Exact Date)   BMI 33.13 kg/m²   Patients' weight gain, fluid intake and exercise level discussed.  Vitals, fundal height , fetal position, and FHR reviewed on flowsheet    Lab:No results found for this or any previous visit (from the past 336 hour(s)).    A/P:  30 y.o.  at 28w1d presents for routine obstetric follow-up.  Size equals dates and/or scan    Blood type A-/antibody screen positive from RhoGam, GC/CT neg, HIV neg, RI,  NIPT -low risk  []Jasmin US  Problems this pregnancy:  --Pap normal with positive high-risk HPV  --Thrombophilia labs- elevated Protein C   On lovenox   Hx of PE (on OCPs)  --Chronic hypertension- on Nifedipine    24 hour urine completed, no baseline labs   []growth US ordered    []NST twice weekly @32 wks unless starts meds  --COVID   Anosmia, no other symptoms  Flu[x] Tdap[x] RhoGam[x]

## 2021-01-04 NOTE — LETTER
"Count Your Baby's Movements  Another step to a healthy delivery  Marcio Stoll             Dept: [phone]    How Many Weeks Pregnant? 28w1d    Date to Begin Countin21              How to use this chart    One way for your physician to keep track of your baby's health is by knowing how often the baby moves (or \"kicks\") in your womb.  You can help your physician to do this by using this chart every day.    Every day, you should see how many hours it takes for your baby to move 10 times.  Start in the morning, as soon as you get up.    · First, write down the time your baby moves until you get to 10.  · Check off one box every time your baby moves until you get to 10.  · Write down the time you finished counting in the last column.  · Total how long it took to count up all 10 movements.  · Finally, fill in the box that shows how long this took.  After counting 10 movements, you no longer have to count any more that day.  The next morning, just start counting again as soon as you get up.    What should you call a \"movement\"?  It is hard to say, because it will feel different from one mother to another and from one pregnancy to the next.  The important thing is that you count the movements the same way throughout your pregnancy.  If you have more questions, you should ask your physician.    Count carefully every day!  SAMPLE:  Week 28    How many hours did it take to feel 10 movements?       Start  Time     1     2     3     4     5     6     7     8     9     10   Finish Time   Mon 8:20 ·  ·  ·  ·  ·  ·  ·  ·  ·  ·  11:40                  Sat               Sun                 IMPORTANT: You should contact your physician if it takes more than two hours for you to feel 10 movements.  Each morning, write down the time and start to count the movements of your baby.  Keep track by checking off one box every time you feel one movement.  When you have felt " "10 \"kicks\", write down the time you finished counting in the last column.  Then fill in the   box (over the check telma) for the number of hours it took.  Be sure to read the complete instructions on the previous page.            "

## 2021-01-07 ENCOUNTER — TELEPHONE (OUTPATIENT)
Dept: OBGYN | Facility: CLINIC | Age: 31
End: 2021-01-07

## 2021-01-07 ENCOUNTER — HOSPITAL ENCOUNTER (EMERGENCY)
Facility: MEDICAL CENTER | Age: 31
End: 2021-01-07
Attending: OBSTETRICS & GYNECOLOGY | Admitting: OBSTETRICS & GYNECOLOGY
Payer: COMMERCIAL

## 2021-01-07 ENCOUNTER — HOSPITAL ENCOUNTER (OUTPATIENT)
Dept: RADIOLOGY | Facility: MEDICAL CENTER | Age: 31
End: 2021-01-07
Attending: OBSTETRICS & GYNECOLOGY
Payer: COMMERCIAL

## 2021-01-07 VITALS
RESPIRATION RATE: 18 BRPM | WEIGHT: 193 LBS | TEMPERATURE: 97.4 F | DIASTOLIC BLOOD PRESSURE: 71 MMHG | BODY MASS INDEX: 32.95 KG/M2 | HEIGHT: 64 IN | SYSTOLIC BLOOD PRESSURE: 109 MMHG | HEART RATE: 79 BPM

## 2021-01-07 DIAGNOSIS — O10.911 CHRONIC HYPERTENSION COMPLICATING OR REASON FOR CARE DURING PREGNANCY, FIRST TRIMESTER: ICD-10-CM

## 2021-01-07 PROCEDURE — 59025 FETAL NON-STRESS TEST: CPT | Mod: 26 | Performed by: OBSTETRICS & GYNECOLOGY

## 2021-01-07 PROCEDURE — 99284 EMERGENCY DEPT VISIT MOD MDM: CPT

## 2021-01-07 PROCEDURE — 76816 OB US FOLLOW-UP PER FETUS: CPT

## 2021-01-07 PROCEDURE — 99282 EMERGENCY DEPT VISIT SF MDM: CPT | Mod: 25 | Performed by: OBSTETRICS & GYNECOLOGY

## 2021-01-07 PROCEDURE — 59025 FETAL NON-STRESS TEST: CPT

## 2021-01-07 ASSESSMENT — PAIN SCALES - GENERAL: PAINLEVEL: 0 - NO PAIN

## 2021-01-07 ASSESSMENT — FIBROSIS 4 INDEX: FIB4 SCORE: 0.34

## 2021-01-07 NOTE — PROGRESS NOTES
"30 y.o.  at 28w4d with JONATHAN of 3/28/2021, Date entered prior to episode creation presents to OB Triage for decreased fetal movement.  She reports that since yesterday she hasn't felt her fetus move as much.  Denies contractions, vaginal bleeding, LOF or other complaints at this time.    /71   Pulse 79   Temp 36.3 °C (97.4 °F) (Temporal)   Resp 18   Ht 1.626 m (5' 4\")   Wt 87.5 kg (193 lb)   LMP 2020 (Exact Date)   BMI 33.13 kg/m²   Gen: WDWN gravid female in no acute distress  CV: Extremities warm, dry, well perfused  Resp: nonlabored breathing on RA  Abd: Gravid, non ttp  Ext: no  edema  Marcio Stoll, a  at 28w4d with an JONATHAN of 3/28/2021, Date entered prior to episode creation, was seen at [facility] for a nonstress test.    Nonstress Test  Reason for NST: Decreased fetal movement  Variability: Moderate  Decelerations: None  Accelerations: Yes  Acoustic Stimulator: No  Baseline: 140  Uterine Irritability: No  Contractions: Not present  Nonstress Test Interpretation  $ Nonstress Test Interpretation - Fetus A: Reactive  NST Interpreted By: husam nino rn  Reactive NST per my read - Dr. Deluna    30 y.o.  28w4d presents today for decreased fetal movement.  #Decreased FM.  Reassuring monitoring, precautions and fetal movement monitoring reviewed. Recommended to stay hydrated with frequent meals.  #Return precautions discussed     Discharged in stable condition.    Evaluation and clinical decision making , including analysis of Fetal data and maternal lab work completed over a 30 minutes period.       "

## 2021-01-07 NOTE — TELEPHONE ENCOUNTER
Pt called to notify us that she has noticed decreased fetal movement since last night.  After 6:30 pm she did not feel baby moving. She stated she felt 10 very soft movements within a 3 hr time frame. She tried drinking cold beverages, jumping jacks and lying on her side but no movents.     Pt was advised to go to ER for further evaluation.  Pt understood and will comply.

## 2021-01-07 NOTE — PROGRESS NOTES
PT is a ; JONATHAN of 3/28; making her 28w4d.     Pt HX of elevated BP. Taking 30xl Nifedipine. 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     Pt here reporting decreased FM since 1800 yesterday. PT reports laying down, drinking juice and baby still didn't meet 10 movements in three hours. Pt denies LOF, VB and reports mild cramping.     EFM and TOCO applied. VSS. Oral hydration and crackers provided. Pt instructed to start kick counts. Reactive NST obtained.     Dr Deluna notified and at bedside to see pt. Dsicussed POC and all questions answered. Pt okay to discharge home.     General discharge instructions, PTL precautions, kick counts and follow up discussed with pt. All questions answered at this time. Pt signed discharge instructions and ambulated out in stable condition; back to work.    [facility]

## 2021-01-11 ENCOUNTER — HOSPITAL ENCOUNTER (OUTPATIENT)
Dept: LAB | Facility: MEDICAL CENTER | Age: 31
End: 2021-01-11
Attending: OBSTETRICS & GYNECOLOGY
Payer: COMMERCIAL

## 2021-01-11 DIAGNOSIS — Z34.02 ENCOUNTER FOR SUPERVISION OF NORMAL FIRST PREGNANCY, SECOND TRIMESTER: ICD-10-CM

## 2021-01-11 LAB
ERYTHROCYTE [DISTWIDTH] IN BLOOD BY AUTOMATED COUNT: 43 FL (ref 35.9–50)
GLUCOSE 1H P 50 G GLC PO SERPL-MCNC: 145 MG/DL (ref 70–139)
HCT VFR BLD AUTO: 39.5 % (ref 37–47)
HGB BLD-MCNC: 12.9 G/DL (ref 12–16)
MCH RBC QN AUTO: 30.6 PG (ref 27–33)
MCHC RBC AUTO-ENTMCNC: 32.7 G/DL (ref 33.6–35)
MCV RBC AUTO: 93.6 FL (ref 81.4–97.8)
PLATELET # BLD AUTO: 317 K/UL (ref 164–446)
PMV BLD AUTO: 10.1 FL (ref 9–12.9)
RBC # BLD AUTO: 4.22 M/UL (ref 4.2–5.4)
TREPONEMA PALLIDUM IGG+IGM AB [PRESENCE] IN SERUM OR PLASMA BY IMMUNOASSAY: NORMAL
WBC # BLD AUTO: 10.1 K/UL (ref 4.8–10.8)

## 2021-01-11 PROCEDURE — 82950 GLUCOSE TEST: CPT

## 2021-01-11 PROCEDURE — 36415 COLL VENOUS BLD VENIPUNCTURE: CPT

## 2021-01-11 PROCEDURE — 85027 COMPLETE CBC AUTOMATED: CPT

## 2021-01-11 PROCEDURE — 86780 TREPONEMA PALLIDUM: CPT

## 2021-01-18 ENCOUNTER — HOSPITAL ENCOUNTER (OUTPATIENT)
Dept: LAB | Facility: MEDICAL CENTER | Age: 31
End: 2021-01-18
Attending: OBSTETRICS & GYNECOLOGY
Payer: COMMERCIAL

## 2021-01-18 ENCOUNTER — ROUTINE PRENATAL (OUTPATIENT)
Dept: OBGYN | Facility: CLINIC | Age: 31
End: 2021-01-18
Payer: COMMERCIAL

## 2021-01-18 VITALS — WEIGHT: 201 LBS | SYSTOLIC BLOOD PRESSURE: 111 MMHG | DIASTOLIC BLOOD PRESSURE: 76 MMHG | BODY MASS INDEX: 34.5 KG/M2

## 2021-01-18 DIAGNOSIS — O09.93 HIGH-RISK PREGNANCY IN THIRD TRIMESTER: ICD-10-CM

## 2021-01-18 DIAGNOSIS — Z86.711 HISTORY OF PULMONARY EMBOLISM: ICD-10-CM

## 2021-01-18 DIAGNOSIS — O98.513 COVID-19 AFFECTING PREGNANCY IN THIRD TRIMESTER: ICD-10-CM

## 2021-01-18 DIAGNOSIS — R73.9 ELEVATED BLOOD SUGAR LEVEL: ICD-10-CM

## 2021-01-18 DIAGNOSIS — U07.1 COVID-19 AFFECTING PREGNANCY IN THIRD TRIMESTER: ICD-10-CM

## 2021-01-18 DIAGNOSIS — O10.911 CHRONIC HYPERTENSION COMPLICATING OR REASON FOR CARE DURING PREGNANCY, FIRST TRIMESTER: ICD-10-CM

## 2021-01-18 DIAGNOSIS — J45.990 EXERCISE-INDUCED ASTHMA: ICD-10-CM

## 2021-01-18 LAB
GLUCOSE 1H P CHAL SERPL-MCNC: 184 MG/DL (ref 65–180)
GLUCOSE 2H P CHAL SERPL-MCNC: 99 MG/DL (ref 65–155)
GLUCOSE 3H P CHAL SERPL-MCNC: 101 MG/DL (ref 65–140)
GLUCOSE BS SERPL-MCNC: 76 MG/DL (ref 65–95)

## 2021-01-18 PROCEDURE — 90040 PR PRENATAL FOLLOW UP: CPT | Performed by: OBSTETRICS & GYNECOLOGY

## 2021-01-18 PROCEDURE — 82952 GTT-ADDED SAMPLES: CPT

## 2021-01-18 PROCEDURE — 36415 COLL VENOUS BLD VENIPUNCTURE: CPT

## 2021-01-18 PROCEDURE — 82951 GLUCOSE TOLERANCE TEST (GTT): CPT

## 2021-01-18 RX ORDER — MONTELUKAST SODIUM 10 MG/1
10 TABLET ORAL DAILY
Qty: 90 TAB | Refills: 2 | Status: SHIPPED | OUTPATIENT
Start: 2021-01-18 | End: 2021-04-18

## 2021-01-18 ASSESSMENT — FIBROSIS 4 INDEX: FIB4 SCORE: 0.34

## 2021-01-18 NOTE — PROGRESS NOTES
S: Pt presents for routine OB follow up.  No contractions, vaginal bleeding, or leaking fluids. Good fetal movement.    Questions answered.    O: /76   Wt 91.2 kg (201 lb)   LMP 2020 (Exact Date)   BMI 34.50 kg/m²   Patients' weight gain, fluid intake and exercise level discussed.  Vitals, fundal height , fetal position, and FHR reviewed on flowsheet    Lab:  Recent Results (from the past 336 hour(s))   CBC WITHOUT DIFFERENTIAL    Collection Time: 21  1:28 PM   Result Value Ref Range    WBC 10.1 4.8 - 10.8 K/uL    RBC 4.22 4.20 - 5.40 M/uL    Hemoglobin 12.9 12.0 - 16.0 g/dL    Hematocrit 39.5 37.0 - 47.0 %    MCV 93.6 81.4 - 97.8 fL    MCH 30.6 27.0 - 33.0 pg    MCHC 32.7 (L) 33.6 - 35.0 g/dL    RDW 43.0 35.9 - 50.0 fL    Platelet Count 317 164 - 446 K/uL    MPV 10.1 9.0 - 12.9 fL   GLUCOSE 1HR GESTATIONAL    Collection Time: 21  1:28 PM   Result Value Ref Range    Glucose, Post Dose 145 (H) 70 - 139 mg/dL   T.PALLIDUM AB EIA    Collection Time: 21  1:28 PM   Result Value Ref Range    Syphilis, Treponemal Qual Non-Reactive Non-Reactive       A/P:  30 y.o.  at 30w1d presents for routine obstetric follow-up.  Size equals dates and/or scan    Estimated Date of Delivery: 3/28/21    Blood type A-/antibody screen positive from RhoGam, GC/CT neg, HIV neg, RI,  NIPT -low risk  []Jasmin US  Problems this pregnancy:  --Pap normal with positive high-risk HPV  --Thrombophilia labs- elevated Protein C   On lovenox   Hx of PE (on OCPs)  --Chronic hypertension- on Nifedipine    24 hour urine completed, no baseline labs   [x]growth US EFW 29%   []NST twice weekly @32 wks  --COVID   Anosmia, no other symptoms  Flu[x] Tdap[x] RhoGam[x]  Delivery plan: 38 wk induction due to BP medication with CHTN  increased asthma symptoms, to start montelukast today

## 2021-01-18 NOTE — PROGRESS NOTES
Pt here today for OB follow up  Pt states no complaints  Reports +FM  Good # [phone]  Pharmacy Confirmed.  Chaperone offered and provided.

## 2021-01-25 ENCOUNTER — PATIENT MESSAGE (OUTPATIENT)
Dept: OBGYN | Facility: CLINIC | Age: 31
End: 2021-01-25

## 2021-01-25 DIAGNOSIS — R00.0 TACHYCARDIA: ICD-10-CM

## 2021-01-26 DIAGNOSIS — R00.0 TACHYCARDIA: ICD-10-CM

## 2021-01-26 NOTE — PROGRESS NOTES
Hx of PE, on lovenox, elevated protein C    Having tachycardia persistently.  Feeling heart beat fast in chest.  Ordering a cardio consult, EKG, echo and LE doppler.

## 2021-01-27 ENCOUNTER — HOSPITAL ENCOUNTER (OUTPATIENT)
Dept: CARDIOLOGY | Facility: MEDICAL CENTER | Age: 31
End: 2021-01-27
Attending: OBSTETRICS & GYNECOLOGY
Payer: COMMERCIAL

## 2021-01-27 DIAGNOSIS — R00.0 TACHYCARDIA: ICD-10-CM

## 2021-01-27 LAB
LV EJECT FRACT  99904: 70
LV EJECT FRACT MOD 2C 99903: 58.86
LV EJECT FRACT MOD 4C 99902: 64.13
LV EJECT FRACT MOD BP 99901: 61.61

## 2021-01-27 PROCEDURE — 93306 TTE W/DOPPLER COMPLETE: CPT | Mod: 26 | Performed by: INTERNAL MEDICINE

## 2021-01-27 PROCEDURE — 93306 TTE W/DOPPLER COMPLETE: CPT

## 2021-02-02 ENCOUNTER — APPOINTMENT (OUTPATIENT)
Dept: RADIOLOGY | Facility: MEDICAL CENTER | Age: 31
End: 2021-02-02
Attending: STUDENT IN AN ORGANIZED HEALTH CARE EDUCATION/TRAINING PROGRAM
Payer: COMMERCIAL

## 2021-02-02 ENCOUNTER — ROUTINE PRENATAL (OUTPATIENT)
Dept: OBGYN | Facility: CLINIC | Age: 31
End: 2021-02-02
Payer: COMMERCIAL

## 2021-02-02 ENCOUNTER — APPOINTMENT (OUTPATIENT)
Dept: OBGYN | Facility: CLINIC | Age: 31
End: 2021-02-02
Payer: COMMERCIAL

## 2021-02-02 ENCOUNTER — HOSPITAL ENCOUNTER (OUTPATIENT)
Facility: MEDICAL CENTER | Age: 31
End: 2021-02-02
Attending: OBSTETRICS & GYNECOLOGY | Admitting: OBSTETRICS & GYNECOLOGY
Payer: COMMERCIAL

## 2021-02-02 ENCOUNTER — HOSPITAL ENCOUNTER (EMERGENCY)
Dept: RADIOLOGY | Facility: MEDICAL CENTER | Age: 31
End: 2021-02-02
Attending: OBSTETRICS & GYNECOLOGY | Admitting: OBSTETRICS & GYNECOLOGY
Payer: COMMERCIAL

## 2021-02-02 VITALS
RESPIRATION RATE: 18 BRPM | BODY MASS INDEX: 34.66 KG/M2 | TEMPERATURE: 97.5 F | SYSTOLIC BLOOD PRESSURE: 124 MMHG | OXYGEN SATURATION: 96 % | DIASTOLIC BLOOD PRESSURE: 87 MMHG | HEIGHT: 64 IN | HEART RATE: 86 BPM | WEIGHT: 203 LBS

## 2021-02-02 VITALS — WEIGHT: 203 LBS | DIASTOLIC BLOOD PRESSURE: 73 MMHG | BODY MASS INDEX: 34.84 KG/M2 | SYSTOLIC BLOOD PRESSURE: 116 MMHG

## 2021-02-02 DIAGNOSIS — Z67.91 RH NEGATIVE STATE IN ANTEPARTUM PERIOD, SECOND TRIMESTER: ICD-10-CM

## 2021-02-02 DIAGNOSIS — J45.990 EXERCISE-INDUCED ASTHMA: ICD-10-CM

## 2021-02-02 DIAGNOSIS — U07.1 COVID-19 AFFECTING PREGNANCY IN THIRD TRIMESTER: ICD-10-CM

## 2021-02-02 DIAGNOSIS — O10.911 CHRONIC HYPERTENSION COMPLICATING OR REASON FOR CARE DURING PREGNANCY, FIRST TRIMESTER: ICD-10-CM

## 2021-02-02 DIAGNOSIS — O99.113 THROMBOPHILIA AFFECTING PREGNANCY IN THIRD TRIMESTER, ANTEPARTUM (HCC): ICD-10-CM

## 2021-02-02 DIAGNOSIS — O09.93 HIGH-RISK PREGNANCY IN THIRD TRIMESTER: ICD-10-CM

## 2021-02-02 DIAGNOSIS — D68.59 THROMBOPHILIA AFFECTING PREGNANCY IN THIRD TRIMESTER, ANTEPARTUM (HCC): ICD-10-CM

## 2021-02-02 DIAGNOSIS — G43.109 MIGRAINE WITH AURA AND WITHOUT STATUS MIGRAINOSUS, NOT INTRACTABLE: ICD-10-CM

## 2021-02-02 DIAGNOSIS — Z86.711 HISTORY OF PULMONARY EMBOLISM: ICD-10-CM

## 2021-02-02 DIAGNOSIS — O26.892 RH NEGATIVE STATE IN ANTEPARTUM PERIOD, SECOND TRIMESTER: ICD-10-CM

## 2021-02-02 DIAGNOSIS — O98.513 COVID-19 AFFECTING PREGNANCY IN THIRD TRIMESTER: ICD-10-CM

## 2021-02-02 LAB
APPEARANCE UR: CLEAR
BILIRUB UR STRIP-MCNC: NORMAL MG/DL
COLOR UR AUTO: YELLOW
GLUCOSE UR STRIP.AUTO-MCNC: NEGATIVE MG/DL
KETONES UR STRIP.AUTO-MCNC: NEGATIVE MG/DL
LEUKOCYTE ESTERASE UR QL STRIP.AUTO: NEGATIVE
NITRITE UR QL STRIP.AUTO: NEGATIVE
NST ACOUSTIC STIMULATION: NO
NST ACTION NECESSARY: ABNORMAL
NST ASSESSMENT: ABNORMAL
NST BASELINE: ABNORMAL
NST INDICATIONS: ABNORMAL
NST OTHER DATA: ABNORMAL
NST READ BY: ABNORMAL
NST RETURN: ABNORMAL
NST UTERINE ACTIVITY: NO
PH UR STRIP.AUTO: 7 [PH] (ref 5–8)
PROT UR QL STRIP: NEGATIVE MG/DL
RBC UR QL AUTO: NEGATIVE
SP GR UR STRIP.AUTO: 1.01
UROBILINOGEN UR STRIP-MCNC: NORMAL MG/DL

## 2021-02-02 PROCEDURE — 59025 FETAL NON-STRESS TEST: CPT | Performed by: OBSTETRICS & GYNECOLOGY

## 2021-02-02 PROCEDURE — 90040 PR PRENATAL FOLLOW UP: CPT | Performed by: OBSTETRICS & GYNECOLOGY

## 2021-02-02 PROCEDURE — 76705 ECHO EXAM OF ABDOMEN: CPT

## 2021-02-02 PROCEDURE — 76819 FETAL BIOPHYS PROFIL W/O NST: CPT

## 2021-02-02 PROCEDURE — 59025 FETAL NON-STRESS TEST: CPT

## 2021-02-02 RX ORDER — NIFEDIPINE 30 MG/1
30 TABLET, EXTENDED RELEASE ORAL DAILY
Qty: 30 TAB | Refills: 6 | Status: SHIPPED | OUTPATIENT
Start: 2021-02-02

## 2021-02-02 ASSESSMENT — PAIN SCALES - GENERAL: PAINLEVEL: 4

## 2021-02-02 ASSESSMENT — FIBROSIS 4 INDEX
FIB4 SCORE: 0.34
FIB4 SCORE: 0.34

## 2021-02-02 NOTE — ED PROVIDER NOTES
LABOR AND DELIVERY TRIAGE PROGRESS NOTE    PATIENT ID:  NAME:  Marcio Stoll  MRN:               [id number]  YOB: 1990     30 y.o. female  at 32w2d.    Subjective: Pt sent over from prenatal visit for fetal heart rate deceleration with the instructions to get a BPP. Patient also complaining of RUQ pain that started a week ago, deep wrapping around to her back, worse 1 hour after meals. Family hx positive for gallstones.     Pregnancy complicated by Chronic HTN, migraines, thrombophilia on Lovenox     negative  For CTXS.   positive Feels pain   negative for LOF  negative for vaginal bleeding.   positive for fetal movement    ROS: Patient denies any fever chills, nausea, vomiting, headache, chest pain, shortness of breath, or dysuria or unusual swelling of hands or feet.     Objective:    Vitals:    21 1029   BP: 124/87   Pulse: 86     No data recorded.    General: No acute distress, resting comfortably in bed.  HEENT: normocephalic, nontraumatic, PERRLA, EOMI  Cardiovascular: Heart RRR with no murmurs, rubs or gallops. Distal Pulses 2+  Respiratory: symmetric chest expansion, lungs CTAB, with no wheezes, rales, rhonci  Abdomen: gravid, nontender  Musculoskeletal: strength 5/5 in four extremities  Neuro: non focal with no numbness, tingling or changes in sensation    Cervix:  Not examined   [place]: Uterine Contractions N/A  FHRM: Baseline 150, Accels to 160, no decels, minimal variability    Assessment: 30 y.o. female  at 32w2d. BPP scored 8/8, with CRISTINA of 12. NST reassuring. RUQ US unremarkable. Vitals normal.     Plan:   1. Patient is cleared to return home with family. Encouraged to see MD/DO for increased painful uterine contractions @ 3-5, vaginal bleeding, loss of fluid, or other serious symptoms.    Discussed case with Dr. Walls, [facility] Attending. Case was discussed and attending agreed with plan prior to discharge of patient.     Tali Hernández M.D.

## 2021-02-02 NOTE — NON-PROVIDER
OB follow up   + fetal movement.  No VB, LOF or UC's.  Flu vaccine offered, 11/25/2020  Phone # [phone]  Preferred pharmacy confirmed.  C/o cramping, and increase of clear discharge

## 2021-02-02 NOTE — PROGRESS NOTES
S: Pt presents for routine OB follow up.  Patient is doing well currently without complaints.  Reports good fetal movement.  Denies headaches or scotoma.  Reports normal bowel and bladder functions  No contractions, vaginal bleeding, or leakage of fluid.  She reports a little mucus discharge once in a while.  Patient reports right upper quadrant pain starting about an hour after she eats that radiates to her back.  She has history of DVT and is on Lovenox and also history of thrombophilia.  Patient is also chronic hypertensive and is on NST twice weekly.  Questions answered.    O: /80   LMP 2020 (Exact Date)   Patients' weight gain, fluid intake and exercise level discussed.  Vitals, fundal height , fetal position, and FHR reviewed on flowsheet    Lab:  Recent Results (from the past 336 hour(s))   EC-ECHOCARDIOGRAM COMPLETE W/O CONT    Collection Time: 21 12:56 PM   Result Value Ref Range    Eject.Frac. MOD BP 61.61     Eject.Frac. MOD 4C 64.13     Eject.Frac. MOD 2C 58.86     Left Ventrical Ejection Fraction 70    POCT Fetal Nonstress Test    Collection Time: 21  9:43 AM   Result Value Ref Range    NST Indications CHTN     NST Baseline 140s     NST Uterine Activity no     NST Acoustic Stimulation no     NST Assessment       NST is reactive with accelerations, moderate fetal heart rate variability and 1 minute deceleration down to 80 bpm    NST Action Necessary Patient sent to labor and delivery for BPP     NST Other Data      NST Return      NST Read By MERLY MCCALLUM [[id number]]      NST today is reactive except for 1 minute deceleration.  Patient sent to labor and delivery for BPP and also right upper quadrant ultrasound to rule out gallstones.      A/P:  30 y.o.  at 32w2d presents for routine obstetric follow-up.  Doing well currently  Size equals dates.  Pregnancy complicated by chronic hypertension, thrombophilia and history of DVT.  Blood pressures are well controlled with  Procardia 30 XL daily.  Patient is on Lovenox which will be transitioned to heparin at 36 weeks.  We discussed delivery at 37 to 38 weeks due to chronic hypertension.  I reviewed hypertension and risk in pregnancy including risk for preeclampsia.  Patient is on baby aspirin daily.  Encounter Diagnoses   Name Primary?   • Chronic hypertension complicating or reason for care during pregnancy, second trimester    • High-risk pregnancy in third trimester    • Rh negative state in antepartum period, second trimester    • COVID-19 affecting pregnancy in third trimester    • History of pulmonary embolism    • Exercise-induced asthma    • Migraine with aura and without status migrainosus, not intractable    • Thrombophilia affecting pregnancy in third trimester, antepartum (HCC)            1.  Continue prenatal vitamins.  2.  Fetal kick counts daily reviewed.  3.  Exercise at least 30 minutes daily.  4.  Drink at least 2L of water daily  5.  Pregnancy and  labor precautions educated.  6.  Follow-up in 1 week for OB check and continue twice-weekly NSTs  7.  GBS culture at 36 weeks  8.  Patient sent to labor and delivery for BPP due to fetal heart rate deceleration.  She will also have right upper quadrant ultrasound.  Report called to charge nurse and Dr. Walls

## 2021-02-02 NOTE — PROGRESS NOTES
30 y.o.  EDC 3/28/21 EGA 32.2 sent over from [facility] by Dr. Bello after suspected decel on NST in office. BPP and RUQ US ordered to R/O gallstones ordered. EFM & TOCO applied. Reactive NST obtained. BPP . MIld right sided hydronephrosis seen otherwise WNL. Report to Dr. Walls. Discharge order received. Pt to f/u on Friday at office for repeat NST. Labor precautions given. Pt verbalized understanding. Discharge to home, ambulatory.

## 2021-02-04 ENCOUNTER — HOSPITAL ENCOUNTER (OUTPATIENT)
Dept: RADIOLOGY | Facility: MEDICAL CENTER | Age: 31
End: 2021-02-04
Attending: OBSTETRICS & GYNECOLOGY
Payer: COMMERCIAL

## 2021-02-04 DIAGNOSIS — R00.0 TACHYCARDIA: ICD-10-CM

## 2021-02-04 PROCEDURE — 93970 EXTREMITY STUDY: CPT

## 2021-02-05 ENCOUNTER — ROUTINE PRENATAL (OUTPATIENT)
Dept: OBGYN | Facility: CLINIC | Age: 31
End: 2021-02-05
Payer: COMMERCIAL

## 2021-02-05 DIAGNOSIS — O10.911 CHRONIC HYPERTENSION COMPLICATING OR REASON FOR CARE DURING PREGNANCY, FIRST TRIMESTER: ICD-10-CM

## 2021-02-05 DIAGNOSIS — O98.513 COVID-19 AFFECTING PREGNANCY IN THIRD TRIMESTER: ICD-10-CM

## 2021-02-05 DIAGNOSIS — U07.1 COVID-19 AFFECTING PREGNANCY IN THIRD TRIMESTER: ICD-10-CM

## 2021-02-05 LAB
NST ACOUSTIC STIMULATION: NORMAL
NST ACTION NECESSARY: NORMAL
NST ASSESSMENT: REACTIVE
NST BASELINE: 150
NST INDICATIONS: NORMAL
NST OTHER DATA: NORMAL
NST READ BY: NORMAL
NST RETURN: NORMAL
NST UTERINE ACTIVITY: NORMAL

## 2021-02-05 PROCEDURE — 59025 FETAL NON-STRESS TEST: CPT | Performed by: OBSTETRICS & GYNECOLOGY

## 2021-02-06 NOTE — PROGRESS NOTES
NST Indications HTN    NST Baseline 150    NST Uterine Activity none    NST Acoustic Stimulation n/a    NST Assessment reactive    NST Action Necessary none    NST Other Data mod cassy/no decels    NST Return 2x weekly    NST Read By md Philomena carrillo MD  [facility], Women's Health

## 2021-02-08 ENCOUNTER — ROUTINE PRENATAL (OUTPATIENT)
Dept: OBGYN | Facility: CLINIC | Age: 31
End: 2021-02-08
Payer: COMMERCIAL

## 2021-02-08 ENCOUNTER — HOSPITAL ENCOUNTER (OUTPATIENT)
Dept: RADIOLOGY | Facility: MEDICAL CENTER | Age: 31
End: 2021-02-08
Attending: OBSTETRICS & GYNECOLOGY
Payer: COMMERCIAL

## 2021-02-08 DIAGNOSIS — O13.3 GESTATIONAL HYPERTENSION, THIRD TRIMESTER: ICD-10-CM

## 2021-02-08 DIAGNOSIS — O10.911 CHRONIC HYPERTENSION COMPLICATING OR REASON FOR CARE DURING PREGNANCY, FIRST TRIMESTER: ICD-10-CM

## 2021-02-08 DIAGNOSIS — O09.93 HIGH-RISK PREGNANCY IN THIRD TRIMESTER: ICD-10-CM

## 2021-02-08 LAB
NST ACOUSTIC STIMULATION: NO
NST ACTION NECESSARY: NO
NST ASSESSMENT: REACTIVE
NST BASELINE: 130
NST INDICATIONS: NORMAL
NST OTHER DATA: NORMAL
NST READ BY: NORMAL
NST RETURN: NORMAL
NST UTERINE ACTIVITY: NO

## 2021-02-08 PROCEDURE — 76816 OB US FOLLOW-UP PER FETUS: CPT

## 2021-02-08 PROCEDURE — 59025 FETAL NON-STRESS TEST: CPT | Performed by: OBSTETRICS & GYNECOLOGY

## 2021-02-10 DIAGNOSIS — O36.5930 POOR FETAL GROWTH AFFECTING MANAGEMENT OF MOTHER IN THIRD TRIMESTER, SINGLE OR UNSPECIFIED FETUS: ICD-10-CM

## 2021-02-11 ENCOUNTER — APPOINTMENT (OUTPATIENT)
Dept: OBGYN | Facility: CLINIC | Age: 31
End: 2021-02-11
Payer: COMMERCIAL

## 2021-02-11 DIAGNOSIS — O13.3 GESTATIONAL HYPERTENSION, THIRD TRIMESTER: ICD-10-CM

## 2021-02-15 NOTE — PROGRESS NOTES
IJEOMA:  34w2d    Pt reports doing well.  No concerns today, +FM.    /72   Wt 96.6 kg (213 lb)   LMP 2020 (Exact Date)   BMI 36.56 kg/m²   gen: AAO, NAD  NST Indications cHTN    NST Baseline 150    NST Uterine Activity none    NST Acoustic Stimulation n/a    NST Assessment reactive    NST Action Necessary none    NST Other Data mod cassy/no decels    NST Return 2x weekly    NST Read By estefania wilkinson md            A/P: 30 y.o.  @ 34w2d      Estimated Date of Delivery: 3/28/21    Blood type A-/antibody screen positive from RhoGam, GC/CT neg, HIV neg, RI,  NIPT -low risk  [x]Jasmin US wnl  Problems this pregnancy:  --Pap normal with positive high-risk HPV  --Thrombophilia labs- elevated Protein C   On lovenox   Hx of PE (on OCPs)   [ ] change to heparin 36wks  --Chronic hypertension- on Nifedipine    24 hour urine completed, no baseline labs   [x]growth US EFW 29%   []NST twice weekly @32 wks   [ ] delivery 38wks (earlier if indicated for FGR)  --COVID   Anosmia, no other symptoms  --IUGR 3% dx at 33 wks   [ ] [facility] referral  for doppler/CRISTINA weekly: first appt     Failed 1hr->passed 3hr (failed )  Flu[x] Tdap[x] RhoGam[x]  Delivery plan: 38 wk induction due to BP medication with CHTN    Has scheduled [facility] growth US today; discussed that depending on findings with their ultrasound that could change delivery planning, if severely growth restriction could be indicated at 37 weeks, earlier if with abnormal fetal surveillance.    Discussed will need to transition to heparin at 36 weeks from Lovenox, plan for induction of labor no later than 38 weeks with chronic hypertension.    Continue 2 times weekly NSTs    Philomena Flores MD  [facility], Women's Health

## 2021-02-16 ENCOUNTER — ROUTINE PRENATAL (OUTPATIENT)
Dept: OBGYN | Facility: CLINIC | Age: 31
End: 2021-02-16
Payer: COMMERCIAL

## 2021-02-16 ENCOUNTER — APPOINTMENT (OUTPATIENT)
Dept: OBGYN | Facility: CLINIC | Age: 31
End: 2021-02-16
Payer: COMMERCIAL

## 2021-02-16 VITALS — BODY MASS INDEX: 36.56 KG/M2 | WEIGHT: 213 LBS | DIASTOLIC BLOOD PRESSURE: 72 MMHG | SYSTOLIC BLOOD PRESSURE: 110 MMHG

## 2021-02-16 DIAGNOSIS — O10.911 CHRONIC HYPERTENSION COMPLICATING OR REASON FOR CARE DURING PREGNANCY, FIRST TRIMESTER: ICD-10-CM

## 2021-02-16 PROCEDURE — 90040 PR PRENATAL FOLLOW UP: CPT | Performed by: OBSTETRICS & GYNECOLOGY

## 2021-02-16 PROCEDURE — 59025 FETAL NON-STRESS TEST: CPT | Performed by: OBSTETRICS & GYNECOLOGY

## 2021-02-16 ASSESSMENT — FIBROSIS 4 INDEX: FIB4 SCORE: 0.34

## 2021-02-16 NOTE — PROGRESS NOTES
Pt here today for OB follow up/nst @ 34w2d  Pt states denies VB and LOF  Reports +FM  Good # [phone]  Pharmacy Confirmed.

## 2021-02-19 ENCOUNTER — ROUTINE PRENATAL (OUTPATIENT)
Dept: OBGYN | Facility: CLINIC | Age: 31
End: 2021-02-19
Payer: COMMERCIAL

## 2021-02-19 DIAGNOSIS — O10.911 CHRONIC HYPERTENSION COMPLICATING OR REASON FOR CARE DURING PREGNANCY, FIRST TRIMESTER: ICD-10-CM

## 2021-02-19 LAB
NST ACOUSTIC STIMULATION: NO
NST ACTION NECESSARY: NORMAL
NST ASSESSMENT: NORMAL
NST BASELINE: NORMAL
NST INDICATIONS: NORMAL
NST OTHER DATA: NORMAL
NST READ BY: NORMAL
NST RETURN: NORMAL
NST UTERINE ACTIVITY: NO

## 2021-02-19 PROCEDURE — 59025 FETAL NON-STRESS TEST: CPT | Performed by: OBSTETRICS & GYNECOLOGY

## 2021-02-22 ENCOUNTER — DATING (OUTPATIENT)
Dept: OBGYN | Facility: CLINIC | Age: 31
End: 2021-02-22

## 2021-02-25 ENCOUNTER — ROUTINE PRENATAL (OUTPATIENT)
Dept: OBGYN | Facility: CLINIC | Age: 31
End: 2021-02-25
Payer: COMMERCIAL

## 2021-02-25 DIAGNOSIS — O10.911 CHRONIC HYPERTENSION COMPLICATING OR REASON FOR CARE DURING PREGNANCY, FIRST TRIMESTER: ICD-10-CM

## 2021-02-25 LAB
NST ACOUSTIC STIMULATION: NORMAL
NST ACTION NECESSARY: NORMAL
NST ASSESSMENT: NORMAL
NST BASELINE: NORMAL
NST INDICATIONS: NORMAL
NST OTHER DATA: NORMAL
NST READ BY: NORMAL
NST RETURN: NORMAL
NST UTERINE ACTIVITY: NORMAL

## 2021-02-25 PROCEDURE — 59025 FETAL NON-STRESS TEST: CPT | Performed by: OBSTETRICS & GYNECOLOGY

## 2021-03-01 ENCOUNTER — APPOINTMENT (OUTPATIENT)
Dept: OBGYN | Facility: CLINIC | Age: 31
End: 2021-03-01
Payer: COMMERCIAL

## 2021-03-01 ENCOUNTER — ROUTINE PRENATAL (OUTPATIENT)
Dept: OBGYN | Facility: CLINIC | Age: 31
End: 2021-03-01
Payer: COMMERCIAL

## 2021-03-01 ENCOUNTER — HOSPITAL ENCOUNTER (OUTPATIENT)
Facility: MEDICAL CENTER | Age: 31
End: 2021-03-01
Attending: OBSTETRICS & GYNECOLOGY
Payer: COMMERCIAL

## 2021-03-01 VITALS — DIASTOLIC BLOOD PRESSURE: 72 MMHG | WEIGHT: 214 LBS | BODY MASS INDEX: 36.73 KG/M2 | SYSTOLIC BLOOD PRESSURE: 123 MMHG

## 2021-03-01 DIAGNOSIS — O10.911 CHRONIC HYPERTENSION COMPLICATING OR REASON FOR CARE DURING PREGNANCY, FIRST TRIMESTER: ICD-10-CM

## 2021-03-01 DIAGNOSIS — O09.93 SUPERVISION OF HIGH RISK PREGNANCY IN THIRD TRIMESTER: ICD-10-CM

## 2021-03-01 DIAGNOSIS — Z86.711 HISTORY OF PULMONARY EMBOLISM: ICD-10-CM

## 2021-03-01 PROCEDURE — 90040 PR PRENATAL FOLLOW UP: CPT | Mod: 25 | Performed by: OBSTETRICS & GYNECOLOGY

## 2021-03-01 PROCEDURE — 59025 FETAL NON-STRESS TEST: CPT | Performed by: OBSTETRICS & GYNECOLOGY

## 2021-03-01 PROCEDURE — 87081 CULTURE SCREEN ONLY: CPT

## 2021-03-01 PROCEDURE — 87150 DNA/RNA AMPLIFIED PROBE: CPT

## 2021-03-01 RX ORDER — HEPARIN SODIUM 20000 [USP'U]/ML
5000 INJECTION INTRAVENOUS; SUBCUTANEOUS EVERY 12 HOURS
Qty: 10 ML | Refills: 1 | Status: ON HOLD | OUTPATIENT
Start: 2021-03-01 | End: 2021-03-21

## 2021-03-01 ASSESSMENT — FIBROSIS 4 INDEX: FIB4 SCORE: 0.34

## 2021-03-01 NOTE — PROGRESS NOTES
S: Pt presents for routine OB follow up.  No contractions, vaginal bleeding, or leaking fluids. Good fetal movement.      O: LMP 2020 (Exact Date)   There were no vitals filed for this visit.  Vitals, fundal height , fetal position, and FHR reviewed on flowsheet    Patient Active Problem List   Diagnosis   • Migraine with aura and without status migrainosus, not intractable   • Exercise-induced asthma   • History of pulmonary embolism   • Chronic hypertension complicating or reason for care during pregnancy, second trimester   • Rh negative state in antepartum period, second trimester   • High-risk pregnancy in third trimester   • COVID-19 affecting pregnancy in third trimester   • Thrombophilia affecting pregnancy in third trimester, antepartum ([facility])       Lab:  Recent Labs     20  0958 20  1047 20  1055 20  0830   ABOGROUP  --   --  A  --    RUBELLAIGG  --  179.00  --   --    LEGUPUBC30  --   --   --  122.0   HEPBSAG  --  Non-Reactive  --   --    TSHULTRASEN 1.350  --   --   --        A/P:  30 y.o.  at 35w3d presents for routine obstetric follow-up.     #Prenatal care  - Continue prenatal vitamins.  Estimated Date of Delivery: 3/28/21    Blood type A-/antibody screen positive from RhoGam, GC/CT neg, HIV neg, RI,  NIPT -low risk  [x]Jasmin US wnl  Problems this pregnancy:  --Pap normal with positive high-risk HPV  --Thrombophilia labs- elevated Protein C   On lovenox   Hx of PE (on OCPs)   [x] change to heparin 36wks - heparin and syringes ordered, patient instructed on use/timing of DC w/ labor or prior to IOL  --Chronic hypertension- on Nifedipine    24 hour urine completed, no baseline labs   [x]growth US EFW 29%   [x]NST twice weekly @32 wks   [x] delivery 38wks - IOL requested  --COVID   Anosmia, no other symptoms  --IUGR 3% dx at 33 wks--> repeat with correct dates is 50%   [x] [facility] referral  for doppler/CRISTINA weekly: first appt    Dating error - to be dated by sure LMP not 9  wk scan (only 5 days off)    Failed 1hr->passed 3hr (failed 1/4)  Flu[x] Tdap[x] RhoGam [x]  Delivery plan: 38 wk induction due to BP medication with CHTN      - Follow-up: 1wk

## 2021-03-01 NOTE — PROGRESS NOTES
Pt here today for OB follow up  Pt states no complaints  Reports +FM   Good # [phone]  Pharmacy Confirmed.  Chaperone offered and provided.

## 2021-03-03 LAB — GP B STREP DNA SPEC QL NAA+PROBE: NEGATIVE

## 2021-03-04 ENCOUNTER — ROUTINE PRENATAL (OUTPATIENT)
Dept: OBGYN | Facility: CLINIC | Age: 31
End: 2021-03-04
Payer: COMMERCIAL

## 2021-03-04 DIAGNOSIS — O10.911 CHRONIC HYPERTENSION COMPLICATING OR REASON FOR CARE DURING PREGNANCY, FIRST TRIMESTER: Primary | ICD-10-CM

## 2021-03-04 PROCEDURE — 59025 FETAL NON-STRESS TEST: CPT | Performed by: OBSTETRICS & GYNECOLOGY

## 2021-03-04 NOTE — PROGRESS NOTES
Marcio Stoll, a  at 35w6d with an JONATHAN of 2021, by Last Menstrual Period, was seen at [facility] for a nonstress test.    Nonstress Test  Reason for NST: Hypertension  Variability: Moderate  Decelerations: None  Accelerations: Yes  Acoustic Stimulator: No  Baseline: 140  Uterine Irritability: No  Contractions: Not present  Nonstress Test Interpretation  Comments: as per my read reactive NST

## 2021-03-08 ENCOUNTER — ROUTINE PRENATAL (OUTPATIENT)
Dept: OBGYN | Facility: CLINIC | Age: 31
End: 2021-03-08
Payer: COMMERCIAL

## 2021-03-08 VITALS — SYSTOLIC BLOOD PRESSURE: 120 MMHG | BODY MASS INDEX: 37.25 KG/M2 | DIASTOLIC BLOOD PRESSURE: 81 MMHG | WEIGHT: 217 LBS

## 2021-03-08 DIAGNOSIS — Z67.91 RH NEGATIVE STATE IN ANTEPARTUM PERIOD, SECOND TRIMESTER: ICD-10-CM

## 2021-03-08 DIAGNOSIS — Z86.711 HISTORY OF PULMONARY EMBOLISM: ICD-10-CM

## 2021-03-08 DIAGNOSIS — O09.93 HIGH-RISK PREGNANCY IN THIRD TRIMESTER: ICD-10-CM

## 2021-03-08 DIAGNOSIS — O98.513 COVID-19 AFFECTING PREGNANCY IN THIRD TRIMESTER: ICD-10-CM

## 2021-03-08 DIAGNOSIS — O10.911 CHRONIC HYPERTENSION COMPLICATING OR REASON FOR CARE DURING PREGNANCY, FIRST TRIMESTER: ICD-10-CM

## 2021-03-08 DIAGNOSIS — O99.113 THROMBOPHILIA AFFECTING PREGNANCY IN THIRD TRIMESTER, ANTEPARTUM (HCC): ICD-10-CM

## 2021-03-08 DIAGNOSIS — D68.59 THROMBOPHILIA AFFECTING PREGNANCY IN THIRD TRIMESTER, ANTEPARTUM (HCC): ICD-10-CM

## 2021-03-08 DIAGNOSIS — O26.892 RH NEGATIVE STATE IN ANTEPARTUM PERIOD, SECOND TRIMESTER: ICD-10-CM

## 2021-03-08 DIAGNOSIS — U07.1 COVID-19 AFFECTING PREGNANCY IN THIRD TRIMESTER: ICD-10-CM

## 2021-03-08 DIAGNOSIS — O10.911 CHRONIC HYPERTENSION COMPLICATING OR REASON FOR CARE DURING PREGNANCY, FIRST TRIMESTER: Primary | ICD-10-CM

## 2021-03-08 PROCEDURE — 90040 PR PRENATAL FOLLOW UP: CPT | Performed by: OBSTETRICS & GYNECOLOGY

## 2021-03-08 PROCEDURE — 59025 FETAL NON-STRESS TEST: CPT | Performed by: OBSTETRICS & GYNECOLOGY

## 2021-03-08 ASSESSMENT — FIBROSIS 4 INDEX: FIB4 SCORE: 0.34

## 2021-03-08 NOTE — PROGRESS NOTES
Pt here today for OB follow up  Pt states no VB or LOF   Reports +FM  Good # [phone]   Pharmacy Confirmed.  Chaperone offered and declined.   GBS Negative

## 2021-03-08 NOTE — PROCEDURES
Marcio Stoll, a  at 36w3d with an JONATHAN of 2021, by Last Menstrual Period, was seen at [facility] for a nonstress test.    Nonstress Test  Reason for NST: Hypertension  Variability: Moderate  Decelerations: None  Accelerations: Yes  Acoustic Stimulator: No  Baseline: 140  Uterine Irritability: No  Nonstress Test Interpretation  Comments: Reactive NST primary-Dr. Deluna

## 2021-03-08 NOTE — PROGRESS NOTES
S: Pt presents for routine OB follow up.  No contractions, vaginal bleeding, or leaking fluids. Good fetal movement.      O: /81   Wt 98.4 kg (217 lb)   LMP 2020 (Exact Date)   BMI 37.25 kg/m²   Vitals:    21 0945   BP: 120/81   Weight: 98.4 kg (217 lb)     Serial blood pressures 120/81, 116/77, 113/77, 113/76  Vitals, fundal height , fetal position, and FHR reviewed on flowsheet    Patient Active Problem List   Diagnosis   • Migraine with aura and without status migrainosus, not intractable   • Exercise-induced asthma   • History of pulmonary embolism   • Chronic hypertension complicating or reason for care during pregnancy, second trimester   • Rh negative state in antepartum period, second trimester   • High-risk pregnancy in third trimester   • COVID-19 affecting pregnancy in third trimester   • Thrombophilia affecting pregnancy in third trimester, antepartum ([facility])       Lab:  Recent Labs     20  0958 20  1047 20  1055 20  0830   ABOGROUP  --   --  A  --    RUBELLAIGG  --  179.00  --   --    [id number]  --   --   --  122.0   HEPBSAG  --  Non-Reactive  --   --    TSHULTRASEN 1.350  --   --   --        A/P:  30 y.o.  at 36w3d presents for routine obstetric follow-up.     #Prenatal care  - Continue prenatal vitamins.  Blood type A-/antibody screen positive from RhoGam, GC/CT neg, HIV neg, RI,  NIPT -low risk  [x]Jasmin US wnl  Problems this pregnancy:  --Pap normal with positive high-risk HPV  --Thrombophilia labs- elevated Protein C   On lovenox   Hx of PE (on OCPs)   [x] change to heparin 36wks - heparin and syringes ordered, patient instructed on use/timing of DC w/ labor or prior to IOL  --Chronic hypertension- on Nifedipine    24 hour urine completed, no baseline labs   [x]growth US EFW 29%   [x]NST twice weekly @32 wks   [x] delivery 38wks - IOL 3/19  --COVID   Anosmia, no other symptoms  --IUGR 3% dx at 33 wks--> repeat with correct dates is 50%   [x] [facility]PC  referral  for doppler/CRISTINA weekly: first appt 2/16   Dating error - to be dated by sure LMP not 9 wk scan (only 5 days off)    Failed 1hr->passed 3hr (failed 1/4)  Flu[x] Tdap[x] RhoGam[x]  Delivery plan: 38 wk induction due to BP medication with CHTN      - Follow-up: NST this wk

## 2021-03-11 ENCOUNTER — ROUTINE PRENATAL (OUTPATIENT)
Dept: OBGYN | Facility: CLINIC | Age: 31
End: 2021-03-11
Payer: COMMERCIAL

## 2021-03-11 DIAGNOSIS — O10.911 CHRONIC HYPERTENSION COMPLICATING OR REASON FOR CARE DURING PREGNANCY, FIRST TRIMESTER: ICD-10-CM

## 2021-03-11 LAB
NST ACOUSTIC STIMULATION: NORMAL
NST ACTION NECESSARY: NORMAL
NST ASSESSMENT: REACTIVE
NST BASELINE: 130
NST INDICATIONS: NORMAL
NST OTHER DATA: NORMAL
NST READ BY: NORMAL
NST RETURN: NORMAL
NST UTERINE ACTIVITY: NORMAL

## 2021-03-11 PROCEDURE — 59025 FETAL NON-STRESS TEST: CPT | Performed by: OBSTETRICS & GYNECOLOGY

## 2021-03-11 NOTE — PROGRESS NOTES
NST Indications cHTN    NST Baseline 130    NST Uterine Activity none    NST Acoustic Stimulation n/a    NST Assessment reactive    NST Action Necessary none    NST Other Data mod cassy/no decels    NST Return 2x weekly    NST Read By md Philomena carrillo MD  [facility], Women's Health

## 2021-03-15 ENCOUNTER — ROUTINE PRENATAL (OUTPATIENT)
Dept: OBGYN | Facility: CLINIC | Age: 31
End: 2021-03-15
Payer: COMMERCIAL

## 2021-03-15 VITALS — SYSTOLIC BLOOD PRESSURE: 117 MMHG | DIASTOLIC BLOOD PRESSURE: 81 MMHG | BODY MASS INDEX: 37.42 KG/M2 | WEIGHT: 218 LBS

## 2021-03-15 DIAGNOSIS — O09.93 HIGH-RISK PREGNANCY IN THIRD TRIMESTER: ICD-10-CM

## 2021-03-15 DIAGNOSIS — D68.59 THROMBOPHILIA AFFECTING PREGNANCY IN THIRD TRIMESTER, ANTEPARTUM (HCC): ICD-10-CM

## 2021-03-15 DIAGNOSIS — Z67.91 RH NEGATIVE STATE IN ANTEPARTUM PERIOD, SECOND TRIMESTER: ICD-10-CM

## 2021-03-15 DIAGNOSIS — O99.113 THROMBOPHILIA AFFECTING PREGNANCY IN THIRD TRIMESTER, ANTEPARTUM (HCC): ICD-10-CM

## 2021-03-15 DIAGNOSIS — Z86.711 HISTORY OF PULMONARY EMBOLISM: ICD-10-CM

## 2021-03-15 DIAGNOSIS — O10.911 CHRONIC HYPERTENSION COMPLICATING OR REASON FOR CARE DURING PREGNANCY, FIRST TRIMESTER: ICD-10-CM

## 2021-03-15 DIAGNOSIS — O26.892 RH NEGATIVE STATE IN ANTEPARTUM PERIOD, SECOND TRIMESTER: ICD-10-CM

## 2021-03-15 PROCEDURE — 90040 PR PRENATAL FOLLOW UP: CPT | Performed by: OBSTETRICS & GYNECOLOGY

## 2021-03-15 ASSESSMENT — FIBROSIS 4 INDEX: FIB4 SCORE: 0.34

## 2021-03-15 NOTE — PROGRESS NOTES
Chief complaint: Return visit    S: Pt presents for routine OB follow up. Good fetal movement.  No contractions, vaginal bleeding, or leakage of fluid.    Questions answered.    O: /81   Wt 98.9 kg (218 lb)   LMP 2020 (Exact Date)   BMI 37.42 kg/m²   Patients' weight gain, fluid intake and exercise level discussed.  Vitals, fundal height , fetal position, and FHR reviewed on flowsheet    Lab:  Recent Results (from the past 336 hour(s))   GRP B STREP, BY PCR (COREA BROTH)    Collection Time: 21  3:59 PM    Specimen: Genital   Result Value Ref Range    Strep Gp B DNA PCR Negative Negative   POCT Fetal Nonstress Test    Collection Time: 21  1:55 PM   Result Value Ref Range    NST Indications cHTN     NST Baseline 130     NST Uterine Activity none     NST Acoustic Stimulation n/a     NST Assessment reactive     NST Action Necessary none     NST Other Data mod cassy/no decels     NST Return 2x weekly     NST Read By estefania wilkinson md        A/P:  30 y.o.  at 37w3d presents for routine obstetric follow-up.  Size equals dates and/or scan    1.  Continue prenatal vitamins.  2.  Fetal kick counts.  3.  Exercise at least 30 minutes daily.  4.  Drink at least 2L of water daily  5.  Labor precautions educated.  6.  Follow-up in 1 weeks.  7.  GBS negative    Cervix closed.    Patient has induction of labor scheduled for  at 10:00.  Given that the cervix is closed, will add cervical ripening the night before.  We will stop heparin the night prior.    All questions answered

## 2021-03-15 NOTE — NON-PROVIDER
OB follow up   + fetal movement.  No VB, LOF or UC's.  Phone # [phone]  Preferred pharmacy confirmed.  IOL on 03/19/2021  GBS negative  C/o cramping  Pt would like to be checked today for dilation

## 2021-03-16 ENCOUNTER — HOSPITAL ENCOUNTER (OUTPATIENT)
Dept: OBGYN | Facility: MEDICAL CENTER | Age: 31
End: 2021-03-16
Attending: OBSTETRICS & GYNECOLOGY
Payer: COMMERCIAL

## 2021-03-16 LAB
SARS-COV-2 RNA RESP QL NAA+PROBE: NOTDETECTED
SPECIMEN SOURCE: NORMAL

## 2021-03-16 PROCEDURE — U0003 INFECTIOUS AGENT DETECTION BY NUCLEIC ACID (DNA OR RNA); SEVERE ACUTE RESPIRATORY SYNDROME CORONAVIRUS 2 (SARS-COV-2) (CORONAVIRUS DISEASE [COVID-19]), AMPLIFIED PROBE TECHNIQUE, MAKING USE OF HIGH THROUGHPUT TECHNOLOGIES AS DESCRIBED BY CMS-2020-01-R: HCPCS

## 2021-03-16 PROCEDURE — U0005 INFEC AGEN DETEC AMPLI PROBE: HCPCS

## 2021-03-18 ENCOUNTER — HOSPITAL ENCOUNTER (OUTPATIENT)
Facility: MEDICAL CENTER | Age: 31
End: 2021-03-19
Attending: OBSTETRICS & GYNECOLOGY | Admitting: OBSTETRICS & GYNECOLOGY
Payer: COMMERCIAL

## 2021-03-18 PROCEDURE — A9270 NON-COVERED ITEM OR SERVICE: HCPCS | Performed by: ADVANCED PRACTICE MIDWIFE

## 2021-03-18 PROCEDURE — 700102 HCHG RX REV CODE 250 W/ 637 OVERRIDE(OP): Performed by: ADVANCED PRACTICE MIDWIFE

## 2021-03-18 RX ADMIN — DINOPROSTONE 0.5 MG: 0.5 GEL VAGINAL at 23:52

## 2021-03-18 ASSESSMENT — FIBROSIS 4 INDEX: FIB4 SCORE: 0.34

## 2021-03-18 ASSESSMENT — PAIN SCALES - GENERAL: PAINLEVEL: 0 - NO PAIN

## 2021-03-19 ENCOUNTER — HOSPITAL ENCOUNTER (INPATIENT)
Facility: MEDICAL CENTER | Age: 31
LOS: 2 days | End: 2021-03-21
Attending: OBSTETRICS & GYNECOLOGY | Admitting: OBSTETRICS & GYNECOLOGY
Payer: COMMERCIAL

## 2021-03-19 ENCOUNTER — APPOINTMENT (OUTPATIENT)
Dept: OBGYN | Facility: MEDICAL CENTER | Age: 31
End: 2021-03-19
Attending: OBSTETRICS & GYNECOLOGY
Payer: COMMERCIAL

## 2021-03-19 VITALS
DIASTOLIC BLOOD PRESSURE: 77 MMHG | BODY MASS INDEX: 37.22 KG/M2 | WEIGHT: 218 LBS | SYSTOLIC BLOOD PRESSURE: 113 MMHG | TEMPERATURE: 98.2 F | OXYGEN SATURATION: 97 % | RESPIRATION RATE: 20 BRPM | HEIGHT: 64 IN | HEART RATE: 90 BPM

## 2021-03-19 LAB
ABO GROUP BLD: ABNORMAL
ALBUMIN SERPL BCP-MCNC: 3.3 G/DL (ref 3.2–4.9)
ALBUMIN/GLOB SERPL: 1.1 G/DL
ALP SERPL-CCNC: 75 U/L (ref 30–99)
ALT SERPL-CCNC: 18 U/L (ref 2–50)
ANION GAP SERPL CALC-SCNC: 11 MMOL/L (ref 7–16)
AST SERPL-CCNC: 13 U/L (ref 12–45)
BASOPHILS # BLD AUTO: 0.5 % (ref 0–1.8)
BASOPHILS # BLD: 0.06 K/UL (ref 0–0.12)
BILIRUB SERPL-MCNC: 0.3 MG/DL (ref 0.1–1.5)
BLD GP AB INVEST PLASRBC-IMP: ABNORMAL
BLD GP AB SCN SERPL QL: ABNORMAL
BUN SERPL-MCNC: 9 MG/DL (ref 8–22)
CALCIUM SERPL-MCNC: 9.1 MG/DL (ref 8.5–10.5)
CFT BLD TEG: 5.8 MIN (ref 5–10)
CHLORIDE SERPL-SCNC: 107 MMOL/L (ref 96–112)
CLOT ANGLE BLD TEG: 71.1 DEGREES (ref 53–72)
CLOT LYSIS 30M P MA LENFR BLD TEG: 0 % (ref 0–8)
CO2 SERPL-SCNC: 18 MMOL/L (ref 20–33)
CREAT SERPL-MCNC: 0.47 MG/DL (ref 0.5–1.4)
CT.EXTRINSIC BLD ROTEM: 1.3 MIN (ref 1–3)
EOSINOPHIL # BLD AUTO: 0.25 K/UL (ref 0–0.51)
EOSINOPHIL NFR BLD: 2.2 % (ref 0–6.9)
ERYTHROCYTE [DISTWIDTH] IN BLOOD BY AUTOMATED COUNT: 42.4 FL (ref 35.9–50)
GLOBULIN SER CALC-MCNC: 3.1 G/DL (ref 1.9–3.5)
GLUCOSE SERPL-MCNC: 101 MG/DL (ref 65–99)
HCT VFR BLD AUTO: 36.2 % (ref 37–47)
HGB BLD-MCNC: 12.6 G/DL (ref 12–16)
HOLDING TUBE BB 8507: NORMAL
IMM GRANULOCYTES # BLD AUTO: 0.08 K/UL (ref 0–0.11)
IMM GRANULOCYTES NFR BLD AUTO: 0.7 % (ref 0–0.9)
LYMPHOCYTES # BLD AUTO: 1.63 K/UL (ref 1–4.8)
LYMPHOCYTES NFR BLD: 14.4 % (ref 22–41)
MCF BLD TEG: 76.4 MM (ref 50–70)
MCH RBC QN AUTO: 31.2 PG (ref 27–33)
MCHC RBC AUTO-ENTMCNC: 34.8 G/DL (ref 33.6–35)
MCV RBC AUTO: 89.6 FL (ref 81.4–97.8)
MONOCYTES # BLD AUTO: 1 K/UL (ref 0–0.85)
MONOCYTES NFR BLD AUTO: 8.8 % (ref 0–13.4)
NEUTROPHILS # BLD AUTO: 8.3 K/UL (ref 2–7.15)
NEUTROPHILS NFR BLD: 73.4 % (ref 44–72)
NRBC # BLD AUTO: 0 K/UL
NRBC BLD-RTO: 0 /100 WBC
PA AA BLD-ACNC: 0 %
PA ADP BLD-ACNC: 0.2 %
PLATELET # BLD AUTO: 310 K/UL (ref 164–446)
PMV BLD AUTO: 10.1 FL (ref 9–12.9)
POTASSIUM SERPL-SCNC: 4.1 MMOL/L (ref 3.6–5.5)
PROT SERPL-MCNC: 6.4 G/DL (ref 6–8.2)
RBC # BLD AUTO: 4.04 M/UL (ref 4.2–5.4)
RH BLD: ABNORMAL
SODIUM SERPL-SCNC: 136 MMOL/L (ref 135–145)
TEG ALGORITHM TGALG: ABNORMAL
WBC # BLD AUTO: 11.3 K/UL (ref 4.8–10.8)

## 2021-03-19 PROCEDURE — 86901 BLOOD TYPING SEROLOGIC RH(D): CPT

## 2021-03-19 PROCEDURE — 86870 RBC ANTIBODY IDENTIFICATION: CPT

## 2021-03-19 PROCEDURE — 3E0P7VZ INTRODUCTION OF HORMONE INTO FEMALE REPRODUCTIVE, VIA NATURAL OR ARTIFICIAL OPENING: ICD-10-PCS | Performed by: OBSTETRICS & GYNECOLOGY

## 2021-03-19 PROCEDURE — 85576 BLOOD PLATELET AGGREGATION: CPT

## 2021-03-19 PROCEDURE — 36415 COLL VENOUS BLD VENIPUNCTURE: CPT

## 2021-03-19 PROCEDURE — 85384 FIBRINOGEN ACTIVITY: CPT

## 2021-03-19 PROCEDURE — 59025 FETAL NON-STRESS TEST: CPT

## 2021-03-19 PROCEDURE — 770002 HCHG ROOM/CARE - OB PRIVATE (112)

## 2021-03-19 PROCEDURE — 85347 COAGULATION TIME ACTIVATED: CPT

## 2021-03-19 PROCEDURE — 86850 RBC ANTIBODY SCREEN: CPT

## 2021-03-19 PROCEDURE — 85025 COMPLETE CBC W/AUTO DIFF WBC: CPT

## 2021-03-19 PROCEDURE — 80053 COMPREHEN METABOLIC PANEL: CPT

## 2021-03-19 PROCEDURE — 86900 BLOOD TYPING SEROLOGIC ABO: CPT

## 2021-03-19 PROCEDURE — 700102 HCHG RX REV CODE 250 W/ 637 OVERRIDE(OP): Performed by: STUDENT IN AN ORGANIZED HEALTH CARE EDUCATION/TRAINING PROGRAM

## 2021-03-19 PROCEDURE — A9270 NON-COVERED ITEM OR SERVICE: HCPCS | Performed by: STUDENT IN AN ORGANIZED HEALTH CARE EDUCATION/TRAINING PROGRAM

## 2021-03-19 RX ORDER — HYDROXYZINE 50 MG/1
50 TABLET, FILM COATED ORAL EVERY 6 HOURS PRN
Status: DISCONTINUED | OUTPATIENT
Start: 2021-03-19 | End: 2021-03-21 | Stop reason: HOSPADM

## 2021-03-19 RX ORDER — NIFEDIPINE 30 MG/1
30 TABLET, EXTENDED RELEASE ORAL DAILY
Status: DISCONTINUED | OUTPATIENT
Start: 2021-03-19 | End: 2021-03-22 | Stop reason: HOSPADM

## 2021-03-19 RX ORDER — MISOPROSTOL 200 UG/1
800 TABLET ORAL
Status: COMPLETED | OUTPATIENT
Start: 2021-03-19 | End: 2021-03-20

## 2021-03-19 RX ORDER — SODIUM CHLORIDE, SODIUM LACTATE, POTASSIUM CHLORIDE, CALCIUM CHLORIDE 600; 310; 30; 20 MG/100ML; MG/100ML; MG/100ML; MG/100ML
INJECTION, SOLUTION INTRAVENOUS CONTINUOUS
Status: ACTIVE | OUTPATIENT
Start: 2021-03-19 | End: 2021-03-19

## 2021-03-19 RX ORDER — METHYLERGONOVINE MALEATE 0.2 MG/ML
0.2 INJECTION INTRAVENOUS
Status: DISCONTINUED | OUTPATIENT
Start: 2021-03-19 | End: 2021-03-21 | Stop reason: HOSPADM

## 2021-03-19 RX ADMIN — NIFEDIPINE 30 MG: 30 TABLET, FILM COATED, EXTENDED RELEASE ORAL at 14:15

## 2021-03-19 RX ADMIN — DINOPROSTONE 10 MG: 10 INSERT VAGINAL at 14:13

## 2021-03-19 ASSESSMENT — LIFESTYLE VARIABLES
HAVE YOU EVER FELT YOU SHOULD CUT DOWN ON YOUR DRINKING: NO
HAVE PEOPLE ANNOYED YOU BY CRITICIZING YOUR DRINKING: NO
EVER_SMOKED: NEVER
TOTAL SCORE: 0
CONSUMPTION TOTAL: NEGATIVE
TOTAL SCORE: 0
HOW MANY TIMES IN THE PAST YEAR HAVE YOU HAD 5 OR MORE DRINKS IN A DAY: 0
EVER FELT BAD OR GUILTY ABOUT YOUR DRINKING: NO
ALCOHOL_USE: NO
EVER HAD A DRINK FIRST THING IN THE MORNING TO STEADY YOUR NERVES TO GET RID OF A HANGOVER: NO
AVERAGE NUMBER OF DAYS PER WEEK YOU HAVE A DRINK CONTAINING ALCOHOL: 0
TOTAL SCORE: 0
ON A TYPICAL DAY WHEN YOU DRINK ALCOHOL HOW MANY DRINKS DO YOU HAVE: 0

## 2021-03-19 ASSESSMENT — PATIENT HEALTH QUESTIONNAIRE - PHQ9
SUM OF ALL RESPONSES TO PHQ9 QUESTIONS 1 AND 2: 0
1. LITTLE INTEREST OR PLEASURE IN DOING THINGS: NOT AT ALL
2. FEELING DOWN, DEPRESSED, IRRITABLE, OR HOPELESS: NOT AT ALL

## 2021-03-19 ASSESSMENT — COPD QUESTIONNAIRES
COPD SCREENING SCORE: 0
DO YOU EVER COUGH UP ANY MUCUS OR PHLEGM?: NO/ONLY WITH OCCASIONAL COLDS OR INFECTIONS
HAVE YOU SMOKED AT LEAST 100 CIGARETTES IN YOUR ENTIRE LIFE: NO/DON'T KNOW
DURING THE PAST 4 WEEKS HOW MUCH DID YOU FEEL SHORT OF BREATH: NONE/LITTLE OF THE TIME
IN THE PAST 12 MONTHS DO YOU DO LESS THAN YOU USED TO BECAUSE OF YOUR BREATHING PROBLEMS: DISAGREE/UNSURE

## 2021-03-19 ASSESSMENT — PAIN SCALES - GENERAL: PAINLEVEL: 0 - NO PAIN

## 2021-03-19 ASSESSMENT — FIBROSIS 4 INDEX: FIB4 SCORE: 0.34

## 2021-03-19 NOTE — PROGRESS NOTES
2156- Pt presented to labor unit for OP Gel. Denies UC's, LOF, VB. Abd soft and non tender to touch. Confirms fetal movement. EFM and TOCO applied. POC discussed. Pt verbalized understanding. JENNY Reece CNM notified of pt arrival.      2321- Pt up to void. EFM and TOCO removed.     2347- Pt back to bed, EFM and TOCO applied.     2352- Prepidil gel given per protocol. Pt tolerated procedure well. Reactive tracing noted.     0056- Reactive tracing noted. Pt to be discharged.   Disharge instructions given and explained. Pt verbalized understanding.     0100- Pt discharged home undelivered with family. To return tomorrow for IOL at 1000. Will call at 0900 to see if bed available.

## 2021-03-19 NOTE — H&P
OB H&P:    CC: scheduled induction of labor     HPI:  Ms. Marcio Stoll is a 30 y.o.  @ 38w0d by LMP with 20 week US. Pregnancy complicated by Rh (NEG) status, chronic HTN and history of PE on anticoagulation during this pregnancy. Patient states she is feeling well today. No acute concerns or complaints.     Contractions: Yes   Loss of fluid: No   Vaginal bleeding: No   Fetal movement: present       PNC with [facility]    PNL: WNL  Rh NEG Rhogam , Rubella immune, HIV neg, TrepAb neg, HBsAg NR, GC/CT neg/neg  Glucola: elevated 1 hour, 3 hour WNL  GBS NEG      ROS:  Const: denies fevers, general concerns  CV/resp: reports no concerns  GI: denies abd pain, GI concerns  : see HPI  Neuro: denies HA/vision changes    OB History    Para Term  AB Living   1             SAB TAB Ectopic Molar Multiple Live Births                    # Outcome Date GA Lbr James/2nd Weight Sex Delivery Anes PTL Lv   1 Current                GYN: denies STIs, no cervical procedures    Past Medical History:   Diagnosis Date   • Asthma     Dx'd as child   • Clotting disorder (HCC)     Dx'd on    • History of pulmonary embolism 2020   • Hypertension    • Migraine    • Rh negative state in antepartum period, second trimester 2020       No past surgical history on file.    No current facility-administered medications on file prior to encounter.     Current Outpatient Medications on File Prior to Encounter   Medication Sig Dispense Refill   • heparin 50448 UNIT/ML Solution Inject 0.25 mL under the skin every 12 hours. / vial SQ BID as instructed 10 mL 1   • Insulin Syringes U-100 0.5 mL Please supply necessary syringes for injection of heparin (Patient not taking: Reported on 3/15/2021) 50 Each 2   • aspirin EC (ECOTRIN) 81 MG Tablet Delayed Response Take 81 mg by mouth every day.     • NIFEdipine SR (PROCARDIA XL) 30 MG tablet Take 1 Tab by mouth every day. 30 Tab 6   • montelukast (SINGULAIR) 10 MG Tab Take 1  Tab by mouth every day for 90 days. 90 Tab 2   • Prenatal MV-Min-Fe Fum-FA-DHA (PRENATAL 1 PO) Take  by mouth.         Family History   Problem Relation Age of Onset   • Hypertension Mother    • Alcohol abuse Mother    • Hypertension Father    • Breast Cancer Maternal Aunt    • Hypertension Maternal Grandmother    • Diabetes Maternal Grandmother    • Hypertension Paternal Grandmother    • Diabetes Paternal Grandmother        Social History     Tobacco Use   • Smoking status: Never Smoker   • Smokeless tobacco: Never Used   Substance Use Topics   • Alcohol use: Not Currently   • Drug use: Never         PE:  There were no vitals filed for this visit.  gen: AAO, NAD  abd: soft, gravid, NT, EFW 3700  Ext: NT, no edema    SVE: 1 @ thick /-3  FHT: 140/moderate variability/+ accels/ NO decels  Emmanuel: Intermittent     A/P: 30 y.o.  @ 38w0d admitted to L&D for scheduled IOL due to chronic HTN and Hx of PE on anticoagulation therapy for this pregnancy.     Plan:  - Heparin was stopped on 3/18/2021  - Restart lovenox after delivery (40mg once daily)  - Anticipate vaginal delivery  - Continue Nifedipine 30mg daily for chronic HTN  - PIH labs pending  - Prepidil gel placed 2300 3/18  - Start Cervidil   - recheck patient in 4 hours

## 2021-03-19 NOTE — PROGRESS NOTES
EDC - 21 EGA - 38.0    1244 - Pt arrived to labor and delivery for scheduled IOL for CHTN. Pt placed in room 215.  1311 - External monitors in place X2. Category I FHT at this time. VS elevated in mild range. Pt reports good FM. No complaints of contractions, ROM or vaginal bleeding. SVE closed/thick high. FOB at bedside. POC discussed with pt and family members, all questions answered.   1315 - 18g IV started in left forearm on 1st attempt, labs collected, IV saline locked. Dr. Hernandez at bedside. Update given. Bedside US to confirm presentation=vertex. Admit profile completed.   1413 - cervadil placed in posterior fornix of cervix. Pt educated to lay supine for 2 hour. Pt medicated with oral nifedipine as patient did not take morning dose per orders  1615 - pt reports feeling some contractions. Denies pain. No other needs at this time.   1725 - VS continue to be elevated in mild range-Dr. Hernandez at bedside and aware. No other needs at this time   1900 - report given to DYLAN Vilchis RN. POC discussed

## 2021-03-19 NOTE — INFECTION CONTROL
This patient is considered COVID RECOVERED.    Patient initially tested positive for COVID on 12/162020.  Patient is greater than or equal to 21 days from symptom onset and/or positive test, with symptom improvement.  Per the CDC guidance, this patient no longer requires transmission based precautions.  Patient may be placed on any unit per the bed assignment policy (except CNU), including placement in a semi-private room with a roommate.

## 2021-03-20 ENCOUNTER — ANESTHESIA (OUTPATIENT)
Dept: ANESTHESIOLOGY | Facility: MEDICAL CENTER | Age: 31
End: 2021-03-20
Payer: COMMERCIAL

## 2021-03-20 ENCOUNTER — ANESTHESIA EVENT (OUTPATIENT)
Dept: ANESTHESIOLOGY | Facility: MEDICAL CENTER | Age: 31
End: 2021-03-20
Payer: COMMERCIAL

## 2021-03-20 PROCEDURE — 700101 HCHG RX REV CODE 250

## 2021-03-20 PROCEDURE — 59409 OBSTETRICAL CARE: CPT

## 2021-03-20 PROCEDURE — 700102 HCHG RX REV CODE 250 W/ 637 OVERRIDE(OP): Performed by: NURSE PRACTITIONER

## 2021-03-20 PROCEDURE — 700111 HCHG RX REV CODE 636 W/ 250 OVERRIDE (IP): Performed by: ANESTHESIOLOGY

## 2021-03-20 PROCEDURE — A9270 NON-COVERED ITEM OR SERVICE: HCPCS | Performed by: OBSTETRICS & GYNECOLOGY

## 2021-03-20 PROCEDURE — 10907ZC DRAINAGE OF AMNIOTIC FLUID, THERAPEUTIC FROM PRODUCTS OF CONCEPTION, VIA NATURAL OR ARTIFICIAL OPENING: ICD-10-PCS | Performed by: OBSTETRICS & GYNECOLOGY

## 2021-03-20 PROCEDURE — 700111 HCHG RX REV CODE 636 W/ 250 OVERRIDE (IP): Performed by: NURSE PRACTITIONER

## 2021-03-20 PROCEDURE — 304965 HCHG RECOVERY SERVICES

## 2021-03-20 PROCEDURE — 700102 HCHG RX REV CODE 250 W/ 637 OVERRIDE(OP): Performed by: STUDENT IN AN ORGANIZED HEALTH CARE EDUCATION/TRAINING PROGRAM

## 2021-03-20 PROCEDURE — 700105 HCHG RX REV CODE 258: Performed by: OBSTETRICS & GYNECOLOGY

## 2021-03-20 PROCEDURE — 700111 HCHG RX REV CODE 636 W/ 250 OVERRIDE (IP)

## 2021-03-20 PROCEDURE — 700111 HCHG RX REV CODE 636 W/ 250 OVERRIDE (IP): Performed by: STUDENT IN AN ORGANIZED HEALTH CARE EDUCATION/TRAINING PROGRAM

## 2021-03-20 PROCEDURE — A9270 NON-COVERED ITEM OR SERVICE: HCPCS | Performed by: STUDENT IN AN ORGANIZED HEALTH CARE EDUCATION/TRAINING PROGRAM

## 2021-03-20 PROCEDURE — 770002 HCHG ROOM/CARE - OB PRIVATE (112)

## 2021-03-20 PROCEDURE — 59400 OBSTETRICAL CARE: CPT | Performed by: OBSTETRICS & GYNECOLOGY

## 2021-03-20 PROCEDURE — 700102 HCHG RX REV CODE 250 W/ 637 OVERRIDE(OP): Performed by: OBSTETRICS & GYNECOLOGY

## 2021-03-20 PROCEDURE — 0HQ9XZZ REPAIR PERINEUM SKIN, EXTERNAL APPROACH: ICD-10-PCS | Performed by: OBSTETRICS & GYNECOLOGY

## 2021-03-20 PROCEDURE — 10H07YZ INSERTION OF OTHER DEVICE INTO PRODUCTS OF CONCEPTION, VIA NATURAL OR ARTIFICIAL OPENING: ICD-10-PCS | Performed by: OBSTETRICS & GYNECOLOGY

## 2021-03-20 PROCEDURE — A9270 NON-COVERED ITEM OR SERVICE: HCPCS | Performed by: NURSE PRACTITIONER

## 2021-03-20 RX ORDER — SODIUM CHLORIDE, SODIUM LACTATE, POTASSIUM CHLORIDE, CALCIUM CHLORIDE 600; 310; 30; 20 MG/100ML; MG/100ML; MG/100ML; MG/100ML
INJECTION, SOLUTION INTRAVENOUS CONTINUOUS
Status: DISCONTINUED | OUTPATIENT
Start: 2021-03-20 | End: 2021-03-22 | Stop reason: HOSPADM

## 2021-03-20 RX ORDER — SODIUM CHLORIDE, SODIUM LACTATE, POTASSIUM CHLORIDE, AND CALCIUM CHLORIDE .6; .31; .03; .02 G/100ML; G/100ML; G/100ML; G/100ML
250 INJECTION, SOLUTION INTRAVENOUS PRN
Status: DISCONTINUED | OUTPATIENT
Start: 2021-03-20 | End: 2021-03-21 | Stop reason: HOSPADM

## 2021-03-20 RX ORDER — SODIUM CHLORIDE 9 MG/ML
INJECTION, SOLUTION INTRAVENOUS
Status: ACTIVE
Start: 2021-03-20 | End: 2021-03-21

## 2021-03-20 RX ORDER — SODIUM CHLORIDE, SODIUM LACTATE, POTASSIUM CHLORIDE, AND CALCIUM CHLORIDE .6; .31; .03; .02 G/100ML; G/100ML; G/100ML; G/100ML
1000 INJECTION, SOLUTION INTRAVENOUS
Status: DISCONTINUED | OUTPATIENT
Start: 2021-03-20 | End: 2021-03-21 | Stop reason: HOSPADM

## 2021-03-20 RX ORDER — SUMATRIPTAN 50 MG/1
50 TABLET, FILM COATED ORAL ONCE
Status: COMPLETED | OUTPATIENT
Start: 2021-03-20 | End: 2021-03-20

## 2021-03-20 RX ORDER — TRANEXAMIC ACID 100 MG/ML
INJECTION, SOLUTION INTRAVENOUS
Status: COMPLETED
Start: 2021-03-20 | End: 2021-03-20

## 2021-03-20 RX ORDER — ROPIVACAINE HYDROCHLORIDE 2 MG/ML
INJECTION, SOLUTION EPIDURAL; INFILTRATION; PERINEURAL CONTINUOUS
Status: DISCONTINUED | OUTPATIENT
Start: 2021-03-20 | End: 2021-03-21

## 2021-03-20 RX ORDER — IBUPROFEN 600 MG/1
600 TABLET ORAL EVERY 6 HOURS PRN
Status: DISCONTINUED | OUTPATIENT
Start: 2021-03-20 | End: 2021-03-21

## 2021-03-20 RX ORDER — ALUMINA, MAGNESIA, AND SIMETHICONE 2400; 2400; 240 MG/30ML; MG/30ML; MG/30ML
30 SUSPENSION ORAL 4 TIMES DAILY PRN
Status: DISCONTINUED | OUTPATIENT
Start: 2021-03-20 | End: 2021-03-22 | Stop reason: HOSPADM

## 2021-03-20 RX ORDER — TRANEXAMIC ACID 100 MG/ML
INJECTION, SOLUTION INTRAVENOUS
Status: COMPLETED
Start: 2021-03-20 | End: 2021-03-21

## 2021-03-20 RX ORDER — ACETAMINOPHEN 325 MG/1
650 TABLET ORAL EVERY 6 HOURS PRN
Status: DISCONTINUED | OUTPATIENT
Start: 2021-03-20 | End: 2021-03-22 | Stop reason: HOSPADM

## 2021-03-20 RX ORDER — ROPIVACAINE HYDROCHLORIDE 2 MG/ML
INJECTION, SOLUTION EPIDURAL; INFILTRATION; PERINEURAL
Status: COMPLETED
Start: 2021-03-20 | End: 2021-03-20

## 2021-03-20 RX ORDER — DEXTROSE, SODIUM CHLORIDE, SODIUM LACTATE, POTASSIUM CHLORIDE, AND CALCIUM CHLORIDE 5; .6; .31; .03; .02 G/100ML; G/100ML; G/100ML; G/100ML; G/100ML
INJECTION, SOLUTION INTRAVENOUS CONTINUOUS
Status: DISCONTINUED | OUTPATIENT
Start: 2021-03-20 | End: 2021-03-21

## 2021-03-20 RX ORDER — ACETAMINOPHEN 325 MG/1
325 TABLET ORAL EVERY 4 HOURS PRN
Status: DISCONTINUED | OUTPATIENT
Start: 2021-03-20 | End: 2021-03-22 | Stop reason: HOSPADM

## 2021-03-20 RX ADMIN — ALUMINUM HYDROXIDE, MAGNESIUM HYDROXIDE, AND DIMETHICONE 30 ML: 400; 400; 40 SUSPENSION ORAL at 05:41

## 2021-03-20 RX ADMIN — TRANEXAMIC ACID 1000 MG: 100 INJECTION, SOLUTION INTRAVENOUS at 22:01

## 2021-03-20 RX ADMIN — NIFEDIPINE 30 MG: 30 TABLET, FILM COATED, EXTENDED RELEASE ORAL at 20:12

## 2021-03-20 RX ADMIN — ACETAMINOPHEN 650 MG: 325 TABLET, FILM COATED ORAL at 18:04

## 2021-03-20 RX ADMIN — OXYTOCIN 1 MILLI-UNITS/MIN: 10 INJECTION, SOLUTION INTRAMUSCULAR; INTRAVENOUS at 10:36

## 2021-03-20 RX ADMIN — OXYTOCIN 2000 ML/HR: 10 INJECTION, SOLUTION INTRAMUSCULAR; INTRAVENOUS at 21:45

## 2021-03-20 RX ADMIN — SODIUM CHLORIDE, POTASSIUM CHLORIDE, SODIUM LACTATE AND CALCIUM CHLORIDE: 600; 310; 30; 20 INJECTION, SOLUTION INTRAVENOUS at 14:05

## 2021-03-20 RX ADMIN — ROPIVACAINE HYDROCHLORIDE: 2 INJECTION, SOLUTION EPIDURAL; INFILTRATION at 14:05

## 2021-03-20 RX ADMIN — MISOPROSTOL 800 MCG: 200 TABLET ORAL at 21:50

## 2021-03-20 RX ADMIN — BUPIVACAINE HYDROCHLORIDE 5 ML: 2.5 INJECTION, SOLUTION EPIDURAL; INFILTRATION; INTRACAUDAL; PERINEURAL at 05:12

## 2021-03-20 RX ADMIN — BUPIVACAINE HYDROCHLORIDE 5 ML: 2.5 INJECTION, SOLUTION EPIDURAL; INFILTRATION; INTRACAUDAL; PERINEURAL at 05:05

## 2021-03-20 RX ADMIN — ROPIVACAINE HYDROCHLORIDE 200 MG: 2 INJECTION, SOLUTION EPIDURAL; INFILTRATION at 05:10

## 2021-03-20 RX ADMIN — SUMATRIPTAN SUCCINATE 50 MG: 50 TABLET ORAL at 01:26

## 2021-03-20 RX ADMIN — SODIUM CHLORIDE, SODIUM LACTATE, POTASSIUM CHLORIDE, CALCIUM CHLORIDE AND DEXTROSE MONOHYDRATE: 5; 600; 310; 30; 20 INJECTION, SOLUTION INTRAVENOUS at 18:05

## 2021-03-20 ASSESSMENT — PAIN DESCRIPTION - PAIN TYPE: TYPE: ACUTE PAIN

## 2021-03-20 NOTE — PROGRESS NOTES
Late entry:    1900- Received report from LOUISA Degroot RN. Assumed care pt. Pt denies needs at this time. Encouraged to call with needs. Will monitor.

## 2021-03-20 NOTE — ANESTHESIA PROCEDURE NOTES
Epidural Block    Date/Time: 3/20/2021 5:03 AM  Performed by: Candida Lee M.D.  Authorized by: Candida Lee M.D.     Patient Location:  OB  Start Time:  3/20/2021 5:03 AM  Reason for Block: labor analgesia    patient identified, IV checked, site marked, risks and benefits discussed, surgical consent, monitors and equipment checked, pre-op evaluation and timeout performed    Patient Position:  Sitting  Prep: ChloraPrep, patient draped and sterile technique    Monitoring:  Blood pressure, continuous pulse oximetry and heart rate  Approach:  Midline  Location:  L3-L4  Injection Technique:  HOWARD saline  Skin infiltration:  Lidocaine  Strength:  1%  Dose:  3ml  Needle Type:  Tuohy  Needle Gauge:  17 G  Needle Length:  3.5 in  Loss of resistance::  9  Catheter Size:  19 G  Catheter at Skin Depth:  14  Test Dose Result:  Negative   successful first attempt, classic HOWARD with saline,EZ cath thread, negative aspiration x2, negative test dose x2 (3/2ml), fractionated bolus dose (5/5ml) with negative aspiration between, all meds PF

## 2021-03-20 NOTE — CARE PLAN
Problem: Pain  Goal: Alleviation of Pain or a reduction in pain to the patient's comfort goal  Outcome: PROGRESSING AS EXPECTED  Note: Pt desired epidural for pain management. Dr. Lee placed epidural without incident. Pt states adequate pain relief.

## 2021-03-20 NOTE — PROGRESS NOTES
Labor Progress Note:      S:  Pt reports she is feeling frustrated but still doing okay. She is anxious to meet baby. Her pain is well controlled right now.     Patient Vitals for the past 4 hrs:   BP Pulse   21 1621 140/83 77   21 1601 109/63 74   21 1544 108/66 77   21 1422 112/70 71   21 1402 139/66 73   21 1342 126/83 78   21 1321 124/87 84     Gen: AAO, NAD    SVE: 6-//-3    FHT: 125/moderate variability/+accels/NO decels  toco: every 2-3 minutes      A/P: 30 y.o.  @ 38w1d  - labor: latent   - FHT: cat I  - GBS: NEG  - pain: well controlled with epidural   - re-positioned patient on her left side  - recheck patient in 2 hours

## 2021-03-20 NOTE — PROGRESS NOTES
PATIENT ID:  NAME:  Marcio Stoll  MRN:               [id number]  YOB: 1990    Subjective:   Pt reports some cramping that as intensified more recently.     Objective:    Vitals:    21 1400 21 1725 21 1935 21 0107   BP: 126/86 141/98 125/86 135/80   Pulse: 82 82 86 90   Resp:  18     Temp:  36.6 °C (97.8 °F) (!) 35.6 °C (96 °F) 36 °C (96.8 °F)   TempSrc:  Temporal Temporal Temporal   SpO2:       Weight:       Height:           Cervix:  4cm/70%/-2, intact membranes  [place]: Uterine Contractions Q 2-3 minutes.   FHRM: Baseline 130 , moderate variability, Accels present, no decels     Assessment:   30 y.o. female  at 38w1d.    Plan:   1. LR for IV hydration  2. Cervidil removed and will plan for continued pitocin induction in 30 minutes followed by AROM  3. Pain management options discussed with patient; she may want an epidural sooner than later but at this point, is coping with pain well   4. Continuous fetal heart rate and maternal monitoring  5. Anticipate

## 2021-03-20 NOTE — PROGRESS NOTES
Report was received from previous shift.  Patient just received an epidural and had artificial rupture of membranes.  I saw and evaluated patient and she is without any complaints or concerns.  I discussed labor management and plan of care.  Fetal heart rate tracing is category 1.  We will continue with present management

## 2021-03-20 NOTE — PROGRESS NOTES
Progress Note    Subjective: Patient is comfortable with epidural.  Nurse reports that she is unable to monitor contractions and requested IUPC placement.  IUPC was discussed with patient and patient agrees    Objective Data:  Recent Labs     03/19/21  1320   WBC 11.3*   RBC 4.04*   HEMOGLOBIN 12.6   HEMATOCRIT 36.2*   MCV 89.6   MCH 31.2   MCHC 34.8   RDW 42.4   PLATELETCT 310   MPV 10.1     Recent Labs     03/19/21  1320   SODIUM 136   POTASSIUM 4.1   CHLORIDE 107   CO2 18*   GLUCOSE 101*   BUN 9   CREATININE 0.47*   CALCIUM 9.1       Vitals:    03/20/21 1021 03/20/21 1036 03/20/21 1042 03/20/21 1102   BP: 105/55  136/77 132/84   Pulse: 81  96 90   Resp:       Temp:  36 °C (96.8 °F)     TempSrc:  Temporal     SpO2:       Weight:       Height:             Intake/Output Summary (Last 24 hours) at 3/20/2021 1220  Last data filed at 3/20/2021 1100  Gross per 24 hour   Intake 1125 ml   Output 2700 ml   Net -1575 ml     SVE: 6 cm/70% effaced/-3 station  IUPC placed without difficulty    Fetal heart rate tracing: Fetal heart rate baseline is in 140s with accelerations and no decelerations.  Moderate fetal heart rate variability is present.  On tocometer, contractions were noted 2 to 3 minutes.  Category 1 tracing    Current Facility-Administered Medications   Medication Dose Route Frequency Provider Last Rate Last Admin   • lactated ringers (BOLUS) infusion  1,000 mL Intravenous Once PRN Candida Lee M.D.       • ropivacaine 0.2 % (NAROPIN) injection   Epidural Continuous Candida Lee M.D.   200 mg at 03/20/21 0510   • ePHEDrine injection 5 mg  5 mg Intravenous Q5 MIN PRN Candida Lee M.D.        And   • lactated ringers infusion (BOLUS)  250 mL Intravenous PRN Candida Lee M.D.       • mag hydrox-al hydrox-simeth (MAALOX PLUS ES or MYLANTA DS) suspension 30 mL  30 mL Oral 4X/DAY PRN Bridget Yoder, A.P.R.N.   30 mL at 03/20/21 0541   • oxytocin (PITOCIN) 20 UNITS/1000ML LR (induction of labor)  0.5-20  adi-units/min Intravenous Continuous Bridget Yoder A.P.R.NRaj 9 mL/hr at 03/20/21 1117 3 adi-units/min at 03/20/21 1117   • fentaNYL (SUBLIMAZE) injection 50 mcg  50 mcg Intravenous Q HOUR PRN Katelyn Hernandez M.D.       • fentaNYL (SUBLIMAZE) injection 100 mcg  100 mcg Intravenous Q HOUR PRN Katelyn Hernandez M.D.       • hydrOXYzine HCl (ATARAX) tablet 50 mg  50 mg Oral Q6HRS PRN Katelyn Hernandez M.D.       • oxytocin (PITOCIN) infusion (for induction)  0.5-20 adi-units/min Intravenous Continuous Katelyn Hernandez M.D.       • miSOPROStol (CYTOTEC) tablet 800 mcg  800 mcg Rectal Once PRN Katelyn Hernandez M.D.       • methylergonovine (METHERGINE) injection 0.2 mg  0.2 mg Intramuscular Once PRN Katelyn Hernandez M.D.       • NIFEdipine SR (PROCARDIA-XL) tablet 30 mg  30 mg Oral DAILY Katelyn Hernandez M.D.   Stopped at 03/20/21 0600     Facility-Administered Medications Ordered in Other Encounters   Medication Dose Route Frequency Provider Last Rate Last Admin   • Bupivacaine in NaCl PF injection   Epidural PRN Candida Lee M.D.   5 mL at 03/20/21 0512       Assessment:  30-year-old G1 at 38 weeks and 1 day pregnant and active labor  Status post epidural  Category 1 fetal heart rate tracing  Inability to monitor contractions-IUPC was placed    Plan:   We'll continue present management  Plan of care discussed.

## 2021-03-20 NOTE — PROGRESS NOTES
Labor Progress Note:      S:  Pt reports she is starting to feel some contractions. Pain is tolerable at this point.     Patient Vitals for the past 4 hrs:   BP Pulse   21 1400 126/86 82   21 1346 135/95 79     Gen: AAO, NAD    SVE: deferred at this time    FHT: 125/moderate variability/+accels/No decels  toco: 5-7 mintes ctx      A/P: 30 y.o.  @ 38w0d   - labor: latent  - FHT: cat I  - GBS: NEG  - pain: controlled, may want epidural as labor progresses  - cervidil placed at 215pm.

## 2021-03-20 NOTE — PROGRESS NOTES
0230- MIS Yoder CNM at . Strip reviewed. Cervadil removed. SVE 4/70/-2. POC discussed- will monitor pts contraction pattern, may start pitocin if needed. Pt may have epidural when desired. Pt verbalizes an understanding.

## 2021-03-20 NOTE — PROGRESS NOTES
PATIENT ID:  NAME:  Marcio Stoll  MRN:               [id number]  YOB: 1990    Subjective:   Pt now comfortable with an epidural.     Objective:    Vitals:    21 0540 21 0602 21 0622 21 0644   BP: 110/67 (!) 92/55 (!) 96/61 102/55   Pulse: 83 88 83 85   Resp:       Temp:       TempSrc:       SpO2:       Weight:       Height:           Cervix:  6cm/70%/-3, AROM @ 07:20 clear  [place]: Uterine Contractions Q 3-4 minutes.   FHRM: Baseline 140, moderate variability, Accels present, no decels       Assessment:   30 y.o. female  at 38w1d.      Plan:   1. LR for IV hydration  2. AROM'd and will plan for pitocin augmentation based on contraction pattern post cervidil   3. Continuous fetal heart rate and maternal monitoring  4. Anticipate

## 2021-03-20 NOTE — PROGRESS NOTES
Report received from Minnie Vilchis RN.  Patient is comfortable with epidural and is resting.  Bridget at bedside for AROM at 0720/clear, 6/70/-2.  Contractions spaced, discussed with Dr Bello and Dr Hernandez.  Will recheck for progress and assess for IUPC and pitocin.  Dr Bello at bedside at 1140, IUPC placed SVE is 6/70/-3.  Patient repositioned from side to side with peanut ball. Blood tinged urine noted at noon.  Dr Bello notified at 1330 mcclelland to be replaced, done. Small improvement in color of urine noted.  Patient positioned in hands and knees, on sides, and then positioned with peanut balls and moved from side to side.  1652, late decels noted. Dr Adler notified and tracing reviewed.  Pitocin stopped.  Decels resolved.  Pitocin restarted at 1830 per Dr Bello.  Report given to Minnie DEVINE RN.

## 2021-03-20 NOTE — PROGRESS NOTES
PATIENT ID:  NAME:  Marcio Stoll  MRN:               [id number]  YOB: 1990    Subjective:   Pt reports feeling some light cramping but not much.     Objective:    Vitals:    21 1331 21 1346 21 1400 21 1725   BP: 132/92 135/95 126/86 141/98   Pulse: 85 79 82 82   Resp:    18   Temp:    36.6 °C (97.8 °F)   TempSrc:    Temporal   SpO2:       Weight:       Height:           Cervix:  closed/40%/floating, intact membranes  [place]: Uterine Contractions Q 2-3 minutes.   FHRM: Baseline 140, moderate variability, Accels present, no decels   Vtx confirmed by BSUS     Assessment:   30 y.o. female  at 38w0d.  GBS negative  Vertex     Plan:   1. LR for IV hydration  2. Cervidil checked and will continue use until 12 hrs due to cervix still being closed   3. Continuous fetal heart rate and maternal monitoring  4. Anticipate

## 2021-03-20 NOTE — ANESTHESIA PREPROCEDURE EVALUATION
30F cHTN w/c, RAD last albuterol 2 weeks ago, FVLeiden with PE 11 years ago last lovenox 2 weeks ago/last BID dosing SQheparin almost 48 hours ago, TEG reviewed, requests labor analgesia. TTE reviewed.  Patient denies valvulopathies,   smoking, illicits,   CKD, DM,   FHx or PHx anesthesia cxs.  Discussed risks/benefits NA including bleeding, infection and PDPH and s/sxs of such. Questions answered. Consent obtained.      Relevant Problems   PULMONARY   (+) Exercise-induced asthma      NEURO   (+) History of pulmonary embolism      CARDIAC   (+) Chronic hypertension complicating or reason for care during pregnancy, second trimester   (+) Migraine with aura and without status migrainosus, not intractable      OB   (+) Chronic hypertension complicating or reason for care during pregnancy, second trimester       Physical Exam    Airway   Mallampati: II  TM distance: >3 FB  Neck ROM: full       Cardiovascular - normal exam  Rhythm: regular  Rate: normal  (-) murmur     Dental - normal exam           Pulmonary - normal exam  Breath sounds clear to auscultation     Abdominal    Neurological - normal exam                 Anesthesia Plan    ASA 3   ASA physical status 3 criteria: other (comment)    Plan - epidural   Neuraxial block will be labor analgesia                  Pertinent diagnostic labs and testing reviewed    Informed Consent:    Anesthetic plan and risks discussed with patient.

## 2021-03-21 VITALS
SYSTOLIC BLOOD PRESSURE: 105 MMHG | HEIGHT: 64 IN | HEART RATE: 99 BPM | TEMPERATURE: 98.1 F | OXYGEN SATURATION: 96 % | RESPIRATION RATE: 17 BRPM | BODY MASS INDEX: 37.22 KG/M2 | DIASTOLIC BLOOD PRESSURE: 69 MMHG | WEIGHT: 218 LBS

## 2021-03-21 LAB
ACTION RH IMMUNE GLOB 8505RHG: NORMAL
BASOPHILS # BLD AUTO: 0.3 % (ref 0–1.8)
BASOPHILS # BLD: 0.05 K/UL (ref 0–0.12)
EOSINOPHIL # BLD AUTO: 0.18 K/UL (ref 0–0.51)
EOSINOPHIL NFR BLD: 1.1 % (ref 0–6.9)
ERYTHROCYTE [DISTWIDTH] IN BLOOD BY AUTOMATED COUNT: 42.4 FL (ref 35.9–50)
ERYTHROCYTE [DISTWIDTH] IN BLOOD BY AUTOMATED COUNT: 42.5 FL (ref 35.9–50)
HCT VFR BLD AUTO: 27.8 % (ref 37–47)
HCT VFR BLD AUTO: 27.9 % (ref 37–47)
HGB BLD-MCNC: 9.4 G/DL (ref 12–16)
HGB BLD-MCNC: 9.5 G/DL (ref 12–16)
IMM GRANULOCYTES # BLD AUTO: 0.18 K/UL (ref 0–0.11)
IMM GRANULOCYTES NFR BLD AUTO: 1.1 % (ref 0–0.9)
IMMUNE ROSETTING TEST 8505FMH: NORMAL
LYMPHOCYTES # BLD AUTO: 1.5 K/UL (ref 1–4.8)
LYMPHOCYTES NFR BLD: 9.2 % (ref 22–41)
MCH RBC QN AUTO: 30.7 PG (ref 27–33)
MCH RBC QN AUTO: 31.1 PG (ref 27–33)
MCHC RBC AUTO-ENTMCNC: 33.9 G/DL (ref 33.6–35)
MCHC RBC AUTO-ENTMCNC: 34.1 G/DL (ref 33.6–35)
MCV RBC AUTO: 90.5 FL (ref 81.4–97.8)
MCV RBC AUTO: 91.5 FL (ref 81.4–97.8)
MONOCYTES # BLD AUTO: 0.9 K/UL (ref 0–0.85)
MONOCYTES NFR BLD AUTO: 5.5 % (ref 0–13.4)
NEUTROPHILS # BLD AUTO: 13.45 K/UL (ref 2–7.15)
NEUTROPHILS NFR BLD: 82.8 % (ref 44–72)
NRBC # BLD AUTO: 0 K/UL
NRBC BLD-RTO: 0 /100 WBC
NUMBER OF RH DOSES IND 8505RD: 1
PLATELET # BLD AUTO: 266 K/UL (ref 164–446)
PLATELET # BLD AUTO: 266 K/UL (ref 164–446)
PMV BLD AUTO: 10.4 FL (ref 9–12.9)
PMV BLD AUTO: 10.6 FL (ref 9–12.9)
RBC # BLD AUTO: 3.05 M/UL (ref 4.2–5.4)
RBC # BLD AUTO: 3.06 M/UL (ref 4.2–5.4)
WBC # BLD AUTO: 16.3 K/UL (ref 4.8–10.8)
WBC # BLD AUTO: 21.8 K/UL (ref 4.8–10.8)

## 2021-03-21 PROCEDURE — 700111 HCHG RX REV CODE 636 W/ 250 OVERRIDE (IP): Performed by: STUDENT IN AN ORGANIZED HEALTH CARE EDUCATION/TRAINING PROGRAM

## 2021-03-21 PROCEDURE — 85027 COMPLETE CBC AUTOMATED: CPT

## 2021-03-21 PROCEDURE — 303615 HCHG EPIDURAL/SPINAL ANESTHESIA FOR LABOR

## 2021-03-21 PROCEDURE — A9270 NON-COVERED ITEM OR SERVICE: HCPCS | Performed by: STUDENT IN AN ORGANIZED HEALTH CARE EDUCATION/TRAINING PROGRAM

## 2021-03-21 PROCEDURE — 700101 HCHG RX REV CODE 250: Performed by: STUDENT IN AN ORGANIZED HEALTH CARE EDUCATION/TRAINING PROGRAM

## 2021-03-21 PROCEDURE — 85025 COMPLETE CBC W/AUTO DIFF WBC: CPT

## 2021-03-21 PROCEDURE — 700102 HCHG RX REV CODE 250 W/ 637 OVERRIDE(OP): Performed by: STUDENT IN AN ORGANIZED HEALTH CARE EDUCATION/TRAINING PROGRAM

## 2021-03-21 PROCEDURE — 700102 HCHG RX REV CODE 250 W/ 637 OVERRIDE(OP): Performed by: OBSTETRICS & GYNECOLOGY

## 2021-03-21 PROCEDURE — 700111 HCHG RX REV CODE 636 W/ 250 OVERRIDE (IP): Performed by: OBSTETRICS & GYNECOLOGY

## 2021-03-21 PROCEDURE — 3E0334Z INTRODUCTION OF SERUM, TOXOID AND VACCINE INTO PERIPHERAL VEIN, PERCUTANEOUS APPROACH: ICD-10-PCS | Performed by: OBSTETRICS & GYNECOLOGY

## 2021-03-21 PROCEDURE — 36415 COLL VENOUS BLD VENIPUNCTURE: CPT

## 2021-03-21 PROCEDURE — A9270 NON-COVERED ITEM OR SERVICE: HCPCS | Performed by: OBSTETRICS & GYNECOLOGY

## 2021-03-21 PROCEDURE — 85461 HEMOGLOBIN FETAL: CPT

## 2021-03-21 RX ORDER — DOCUSATE SODIUM 100 MG/1
100 CAPSULE, LIQUID FILLED ORAL 2 TIMES DAILY
Status: DISCONTINUED | OUTPATIENT
Start: 2021-03-21 | End: 2021-03-22 | Stop reason: HOSPADM

## 2021-03-21 RX ORDER — IBUPROFEN 600 MG/1
600 TABLET ORAL EVERY 6 HOURS PRN
Status: DISCONTINUED | OUTPATIENT
Start: 2021-03-21 | End: 2021-03-22 | Stop reason: HOSPADM

## 2021-03-21 RX ORDER — POLYETHYLENE GLYCOL 3350 17 G/17G
1 POWDER, FOR SOLUTION ORAL DAILY
Status: DISCONTINUED | OUTPATIENT
Start: 2021-03-21 | End: 2021-03-22 | Stop reason: HOSPADM

## 2021-03-21 RX ORDER — MISOPROSTOL 200 UG/1
600 TABLET ORAL
Status: DISCONTINUED | OUTPATIENT
Start: 2021-03-21 | End: 2021-03-22 | Stop reason: HOSPADM

## 2021-03-21 RX ORDER — HYDROCODONE BITARTRATE AND ACETAMINOPHEN 5; 325 MG/1; MG/1
1 TABLET ORAL EVERY 4 HOURS PRN
Status: DISCONTINUED | OUTPATIENT
Start: 2021-03-21 | End: 2021-03-22 | Stop reason: HOSPADM

## 2021-03-21 RX ORDER — SODIUM CHLORIDE, SODIUM LACTATE, POTASSIUM CHLORIDE, CALCIUM CHLORIDE 600; 310; 30; 20 MG/100ML; MG/100ML; MG/100ML; MG/100ML
INJECTION, SOLUTION INTRAVENOUS PRN
Status: DISCONTINUED | OUTPATIENT
Start: 2021-03-21 | End: 2021-03-22 | Stop reason: HOSPADM

## 2021-03-21 RX ORDER — PSEUDOEPHEDRINE HCL 30 MG
100 TABLET ORAL 2 TIMES DAILY
Qty: 120 CAPSULE | Refills: 0 | Status: SHIPPED
Start: 2021-03-21 | End: 2021-05-20

## 2021-03-21 RX ADMIN — OXYTOCIN 125 ML/HR: 10 INJECTION, SOLUTION INTRAMUSCULAR; INTRAVENOUS at 00:33

## 2021-03-21 RX ADMIN — DOCUSATE SODIUM 100 MG: 100 CAPSULE, LIQUID FILLED ORAL at 11:35

## 2021-03-21 RX ADMIN — NIFEDIPINE 30 MG: 30 TABLET, FILM COATED, EXTENDED RELEASE ORAL at 06:28

## 2021-03-21 RX ADMIN — POLYETHYLENE GLYCOL 3350 1 PACKET: 17 POWDER, FOR SOLUTION ORAL at 10:35

## 2021-03-21 RX ADMIN — ACETAMINOPHEN 650 MG: 325 TABLET, FILM COATED ORAL at 04:05

## 2021-03-21 RX ADMIN — ENOXAPARIN SODIUM 40 MG: 40 INJECTION SUBCUTANEOUS at 10:35

## 2021-03-21 RX ADMIN — DOCUSATE SODIUM 100 MG: 100 CAPSULE, LIQUID FILLED ORAL at 17:44

## 2021-03-21 RX ADMIN — IBUPROFEN 600 MG: 600 TABLET, FILM COATED ORAL at 10:35

## 2021-03-21 RX ADMIN — IBUPROFEN 600 MG: 600 TABLET, FILM COATED ORAL at 17:44

## 2021-03-21 ASSESSMENT — EDINBURGH POSTNATAL DEPRESSION SCALE (EPDS)
I HAVE BEEN ANXIOUS OR WORRIED FOR NO GOOD REASON: YES, SOMETIMES
THE THOUGHT OF HARMING MYSELF HAS OCCURRED TO ME: NEVER
I HAVE FELT SAD OR MISERABLE: NO, NOT AT ALL
I HAVE BEEN SO UNHAPPY THAT I HAVE BEEN CRYING: NO, NEVER
I HAVE LOOKED FORWARD WITH ENJOYMENT TO THINGS: AS MUCH AS I EVER DID
I HAVE BLAMED MYSELF UNNECESSARILY WHEN THINGS WENT WRONG: YES, SOME OF THE TIME
I HAVE FELT SCARED OR PANICKY FOR NO GOOD REASON: YES, SOMETIMES
THINGS HAVE BEEN GETTING ON TOP OF ME: NO, MOST OF THE TIME I HAVE COPED QUITE WELL
I HAVE BEEN ABLE TO LAUGH AND SEE THE FUNNY SIDE OF THINGS: AS MUCH AS I ALWAYS COULD
I HAVE BEEN SO UNHAPPY THAT I HAVE HAD DIFFICULTY SLEEPING: NOT AT ALL

## 2021-03-21 ASSESSMENT — PAIN DESCRIPTION - PAIN TYPE
TYPE: ACUTE PAIN

## 2021-03-21 ASSESSMENT — PAIN SCALES - GENERAL: PAIN_LEVEL: 0

## 2021-03-21 NOTE — L&D DELIVERY NOTE
DELIVERY SUMMARY     Patient was admitted to Labor and Delivery at 38w1d for IOL due to chronic hypertension    ROSALVA MATA  Is a  , now para: 1, who presented in Not in labor at 38w1d for induction of labor secondary to chronic hypertension which is controlled with Procardia.  She has history of thrombophilia and was on Lovenox in pregnancy which was switched to heparin at 36 weeks gestation which she discontinued day prior to presentation.  Patient had cervical ripening with Cervidil followed by Pitocin induction of labor.  She received an epidural for pain management during labor and amniotomy was performed at 6 cm cervical dilation with clear fluid noted.  Patient progressed in active labor with pitocin induction    to Cervical Exam:  100%; cervical dilatation:10, station: +2, to complete the first stage of labor.  Fetal heart rate tracing remains category 1 throughout labor process except for short duration of recurrent late deceleration that resolved after discontinuation of Pitocin and positional change.  Pitocin induction was restarted an hour later during which time the fetal heart rate tracing remained category 1   Patient then progressed through second stage  and delivered, a viable female infant over a small perineal laceration to sterile field..  No nuchal cords.  Shoulders were delivered without difficulty.  The mouth and nose were suctioned with bulb suction and baby was placed on mom's chest active and crying.  After 1 minute the cord was doubly clamped and cut.   Third Stage:Placenta delivered spontaneously intact with. 3-Vessel cord.  Uterine atony and postpartum hemorrhage was encountered and IV Pitocin was given, uterine massage was performed and Cytotec 800 mcg was placed per rectum.  Bleeding improved but patient had intermittent bleeding and 1 g of tranexamic acid was given IV uterine massage continued and bleeding was brought under control.  Small perineal laceration was  repaired with 3-0 Vicryl suture  [place]: local 1% lidocaine: None  Epidural : positive  Family support: Yes  Infant bonding good:positive  EBL: 800 mL  Sponge count correct: X3  Patient tolerated this procedure well

## 2021-03-21 NOTE — PROGRESS NOTES
Assumed patient care. Took report from Minnie L&JENNIFER RN. Assessed patient, VS stable and within defined parameters, fundus firm at U, lochia light rubra. No pain/redness/swelling in calves.  Patient reports pain as 2/10 on pain scale. Oriented patient to the post partum unit including room features, scheduled medications, welcome letter, and the post partum depression scale. Educated patient on room safety and infant safety. Patient understands and verbalizes safe infant sleeping practices. Current COVID-19 mask policy gone over. Patient and support person acknowledge policy. Current visitor policy discussed. Patient and support person acknowledge policy. Call light within reach. Will continue to monitor patient's vitals.

## 2021-03-21 NOTE — ANESTHESIA POSTPROCEDURE EVALUATION
Patient: Marcio Stoll    Procedure Summary     Date: 03/20/21 Room / Location:     Anesthesia Start: 0443 Anesthesia Stop: 2140    Procedure: Labor Epidural Diagnosis:     Scheduled Providers:  Responsible Provider: Orville Palafox M.D.    Anesthesia Type: neuraxial block ASA Status: 3          Final Anesthesia Type: neuraxial block  Last vitals  BP   Blood Pressure: 145/80    Temp   36.2 °C (97.2 °F)    Pulse   (!) 133   Resp   18    SpO2   96 %      Anesthesia Post Evaluation    Patient location during evaluation: PACU  Patient participation: complete - patient participated  Level of consciousness: awake and alert  Pain score: 0    Airway patency: patent  Anesthetic complications: no  Cardiovascular status: hemodynamically stable  Respiratory status: acceptable  Hydration status: euvolemic    PONV: none          No complications documented.     Nurse Pain Score: 0 (NPRS)

## 2021-03-21 NOTE — PROGRESS NOTES
Obstetrics & Gynecology Post-Delivery Progress Note    Date of Service      30 y.o.  s/p vaginal, spontaneous  Delivery date: 3/20/2021    Events  No events    Subjective  Pain: Yes,  controlled  Bleeding: lochia minimal  Tolerating PO: yes  Voiding: without difficulty  Ambulating: yes  Passing flatus: Yes  Feeding: breastfeeding well    Objective  24hr VS:  Temp:  [36 °C (96.8 °F)-36.9 °C (98.5 °F)] 36.3 °C (97.3 °F)  Pulse:  [] 83  Resp:  [18-19] 18  BP: ()/(50-96) 126/82  SpO2:  [96 %-98 %] 96 %    Physical Exam  General: well  Chest/Breasts: nipples intact   Abdomen: minimal tenderness, soft, non-distended  Fundus: firm and below umbilicus  Incision: not applicable, (vaginal delivery)  Perineum: deferred  Extremities: symmetric and no edema, calves nontender    Labs:  Recent Labs     21  1320 21  0447   HEMOGLOBIN 12.6 9.4*   HEMATOCRIT 36.2* 27.8*   MCV 89.6 90.5   MCH 31.2 30.7   PLATELETCT 310 266         Medications  enoxaparin (LOVENOX) injection, 40 mg, Subcutaneous, DAILY  NS, , ,   NIFEdipine SR, 30 mg, Oral, DAILY      PRN medications: LR, tetanus-dipth-acell pertussis, measles, mumps and rubella vaccine, oxytocin, misoprostol, HYDROcodone-acetaminophen, ibuprofen, acetaminophen, mag hydrox-al hydrox-simeth, acetaminophen      Assessment/Plan  Marcio Stoll is a 30 y.o.yo  s/p postpartum day #1    s/p vaginal, spontaneous    - Post care: meeting all goals   - Pain: controlled  - Rh+, Rubella Immune  - Method of Feeding: plans to breastfeed  - Method of Contraception: will discuss at her follow up visit   VTE prophylaxis: lovenox 40mg subQ daily    - Disposition: likely home postpartum day 1-2

## 2021-03-21 NOTE — ANESTHESIA TIME REPORT
Anesthesia Start and Stop Event Times     Date Time Event    3/20/2021 0443 Anesthesia Start     2140 Anesthesia Stop        Responsible Staff  03/20/21    Name Role Begin End    Candida Lee M.D. Anesth 0443 0700    Orville Palafox M.D. Anesth 0700 2140        Preop Diagnosis (Free Text):  Pre-op Diagnosis             Preop Diagnosis (Codes):    Post op Diagnosis  Vaginal delivery      Premium Reason  E. Weekend    Comments:

## 2021-03-21 NOTE — LACTATION NOTE
This note was copied from a baby's chart.  Mother reports infant latched successfully after birth and a few times after that, has since been sleepy and spitty. Reviewed hand expression and mother able to express drops independently after practice. Assisted with feeding in both cross cradle and football holds, infant takes a few sucks  then holds breast in mouth, remains sleepy with attempts. Hand expressed drops at breast for baby then encouraged mother to hand express and spoon feed to help wake baby after some skin to skin time, will call ARMEN Delatorre when ready to spoon feed.     Mother desires discharge late this evening, discussed signs of optimal breastfeeding versus need for feeding plan. Educated on implementing pumping and supplementation at 24 hours of age if infant is not feeding optimally at breast. Reviewed use of mother's home breast pump, Francisca Gandhi and reviewed supplemental feeding volume guidelines.     Provided outpatient resources and encouraged mother to schedule follow-up at [facility]. Denies questions/concerns.

## 2021-03-21 NOTE — DISCHARGE SUMMARY
Discharge Summary:     Date of Admission: 3/19/2021  Date of Discharge: 21      Admitting diagnosis:    1. Pregnancy @ 38w1d  2. Chronic HTN  3. History of PE from OCPs      Discharge Diagnosis:   1. Status post vaginal, spontaneous.    Pregnancy Complications: Chronic HTN, anticoagulation for history of PE  Tubal Ligation:  no    Past Medical History:   Diagnosis Date   • Asthma     Dx'd as child   • Clotting disorder (HCC)     Dx'd on    • History of pulmonary embolism 2020   • Hypertension    • Migraine    • Rh negative state in antepartum period, second trimester 2020     OB History    Para Term  AB Living   1 1 1     1   SAB TAB Ectopic Molar Multiple Live Births           0 1      # Outcome Date GA Lbr James/2nd Weight Sex Delivery Anes PTL Lv   1 Term 21 38w1d 13:48 / 00:32 2.975 kg (6 lb 8.9 oz) F Vag-Spont EPI N NADYA     No past surgical history on file.  Patient has no known allergies.    Patient Active Problem List   Diagnosis   • Migraine with aura and without status migrainosus, not intractable   • Exercise-induced asthma   • History of pulmonary embolism   • Chronic hypertension complicating or reason for care during pregnancy, second trimester   • Rh negative state in antepartum period, second trimester   • High-risk pregnancy in third trimester   • COVID-19 affecting pregnancy in third trimester   • Thrombophilia affecting pregnancy in third trimester, antepartum ([facility])       Hospital Course:   30 y.o. , now para 1, was admitted with the above mentioned diagnosis, underwent Induction of Labor, vaginal, spontaneous. Pt gave birth to baby girl with APGARs of 8/9 and weight 2975g.  Patient's postpartum course was unremarkable, with progressive advancement in diet , ambulation and toleration of oral analgesia. Patient without complaints today and desires discharge.  For postpartum contraception, pt desires to discuss at follow up visit, given history of PE on  OCPs.    Physical Exam:  Temp:  [36.2 °C (97.2 °F)-36.9 °C (98.5 °F)] 36.9 °C (98.4 °F)  Pulse:  [] 88  Resp:  [17-19] 17  BP: ()/(58-96) 121/91  SpO2:  [96 %-98 %] 98 %  Physical Exam  General: well  Chest/Breasts: nipples intact   Abdomen: nontender, soft, non-distended  Fundus: firm and below umbilicus  Incision: not applicable, (vaginal delivery)  Perineum: deferred  Extremities: symmetric and no edema, calves nontender    Current Facility-Administered Medications   Medication Dose   • LR infusion     • tetanus-dipth-acell pertussis (Tdap) inj 0.5 mL  0.5 mL   • measles, mumps and rubella vaccine (MMR) injection 0.5 mL  0.5 mL   • PRN oxytocin (PITOCIN) (20 Units/1000 mL) PRN for excessive uterine bleeding - See Admin Instr  125-999 mL/hr   • miSOPROStol (CYTOTEC) tablet 600 mcg  600 mcg   • ibuprofen (MOTRIN) tablet 600 mg  600 mg   • HYDROcodone-acetaminophen (NORCO) 5-325 MG per tablet 1 tablet  1 tablet   • enoxaparin (LOVENOX) inj 40 mg  40 mg   • polyethylene glycol/lytes (MIRALAX) PACKET 1 Packet  1 Packet   • docusate sodium (COLACE) capsule 100 mg  100 mg   • Rho D Immune Globulin (RHOPHYLAC) injection  1,500 Units   • oxytocin (PITOCIN) infusion (for postpartum)   mL/hr   • acetaminophen (Tylenol) tablet 325 mg  325 mg   • mag hydrox-al hydrox-simeth (MAALOX PLUS ES or MYLANTA DS) suspension 30 mL  30 mL   • lactated ringers infusion     • acetaminophen (Tylenol) tablet 650 mg  650 mg   • NIFEdipine SR (PROCARDIA-XL) tablet 30 mg  30 mg       Recent Labs     03/19/21  1320 03/21/21  0447 03/21/21  1108   WBC 11.3* 21.8* 16.3*   RBC 4.04* 3.06* 3.05*   HEMOGLOBIN 12.6 9.4* 9.5*   HEMATOCRIT 36.2* 27.8* 27.9*   MCV 89.6 90.5 91.5   MCH 31.2 30.7 31.1   MCHC 34.8 33.9 34.1   RDW 42.4 42.4 42.5   PLATELETCT 310 266 266   MPV 10.1 10.6 10.4       Activity:   Discharge to home  Pelvic Rest x 6 weeks  Call or come to ED for: heavy vaginal bleeding, fever >100.4, severe abdominal pain, severe  headache, chest pain, shortness of breath,  N/V, incisional drainage, or other concerns.      Assessment:  normal postpartum course     Follow up: [facility] or [facility] in 5 weeks for vaginal delivery; 1 week for incision check for  delivery.      Discharge Instructions:  Pelvic rest x 6 weeks  No heavy lifting until cleared by physician  Return to ED or come to the office for severe headache, shortness of breath, chest pain, heavy vaginal bleeding, incisional drainage, foul smelling vaginal discharge, or fever >100.4  - Continue lovenox 40mg daily for 6 weeks for history of PE  - Continue nifedipine 30mg daily for C HTN  - Blood pressure check in 1 week     Discharge Meds:   Current Outpatient Medications   Medication Sig Dispense Refill   • docusate sodium 100 MG Cap Take 100 mg by mouth 2 times a day for 60 days. 120 capsule 0   • [START ON 3/22/2021] enoxaparin (LOVENOX) 40 MG/0.4ML Solution inj Inject 40 mg under the skin every day. 60 Each 0

## 2021-03-21 NOTE — PROGRESS NOTES
Progress Note    Subjective:   Pitocin was stopped for about an hour GTT currently deceleration which resolved after discontinuation of Pitocin.  Patient noted significantly well and is comfortable.  Pitocin was restarted and fetal heart rate tracing remains category 1 during this time.  She reports rectal pressure now    Objective Data:  Recent Labs     03/19/21  1320   WBC 11.3*   RBC 4.04*   HEMOGLOBIN 12.6   HEMATOCRIT 36.2*   MCV 89.6   MCH 31.2   MCHC 34.8   RDW 42.4   PLATELETCT 310   MPV 10.1     Recent Labs     03/19/21  1320   SODIUM 136   POTASSIUM 4.1   CHLORIDE 107   CO2 18*   GLUCOSE 101*   BUN 9   CREATININE 0.47*   CALCIUM 9.1       Vitals:    03/20/21 1921 03/20/21 1941 03/20/21 2002 03/20/21 2022   BP: 135/91 130/87 128/82 133/88   Pulse: 80 83 87 82   Resp:       Temp:       TempSrc:       SpO2:       Weight:       Height:             Intake/Output Summary (Last 24 hours) at 3/20/2021 2059  Last data filed at 3/20/2021 2000  Gross per 24 hour   Intake 1425 ml   Output 2700 ml   Net -1275 ml     SVE: Complete/100%/+1    Fetal heart rate tracing: Fetal heart rate baseline is in the 140s with acceleration and 1 variable type decelerations noted.  Overall fetal heart rate tracing is category 1.  Tocometer contractions were noted 2 to 3 minutes.    Current Facility-Administered Medications   Medication Dose Route Frequency Provider Last Rate Last Admin   • lactated ringers (BOLUS) infusion  1,000 mL Intravenous Once PRN Candida Lee M.D.       • ropivacaine 0.2 % (NAROPIN) injection   Epidural Continuous Candida Lee M.D.   New Bag at 03/20/21 1405   • ePHEDrine injection 5 mg  5 mg Intravenous Q5 MIN PRN Candida Lee M.D.        And   • lactated ringers infusion (BOLUS)  250 mL Intravenous PRN Candida Lee M.D.       • mag hydrox-al hydrox-simeth (MAALOX PLUS ES or MYLANTA DS) suspension 30 mL  30 mL Oral 4X/DAY PRN Bridget Yoder, A.P.R.N.   30 mL at 03/20/21 0541   • oxytocin (PITOCIN) 20  UNITS/1000ML LR (induction of labor)  0.5-20 adi-units/min Intravenous Continuous SHAMAR Davenport 18 mL/hr at 03/20/21 2012 6 adi-units/min at 03/20/21 2012   • lactated ringers infusion   Intravenous Continuous Jose Bello M.D. 125 mL/hr at 03/20/21 1405 New Bag at 03/20/21 1405   • acetaminophen (Tylenol) tablet 650 mg  650 mg Oral Q6HRS PRN Jose Bello M.D.   650 mg at 03/20/21 1804   • D5LR infusion   Intravenous Continuous Jose Bello M.D. 125 mL/hr at 03/20/21 1805 New Bag at 03/20/21 1805   • fentaNYL (SUBLIMAZE) injection 50 mcg  50 mcg Intravenous Q HOUR PRN Katelyn Hernandez M.D.       • fentaNYL (SUBLIMAZE) injection 100 mcg  100 mcg Intravenous Q HOUR PRN Katelyn Hernandez M.D.       • hydrOXYzine HCl (ATARAX) tablet 50 mg  50 mg Oral Q6HRS PRN Katelyn Hernandez M.D.       • oxytocin (PITOCIN) infusion (for induction)  0.5-20 adi-units/min Intravenous Continuous Katelyn Hernandez M.D.       • miSOPROStol (CYTOTEC) tablet 800 mcg  800 mcg Rectal Once PRN Katelyn Hernandez M.D.       • methylergonovine (METHERGINE) injection 0.2 mg  0.2 mg Intramuscular Once PRN Katelyn Hernandez M.D.       • NIFEdipine SR (PROCARDIA-XL) tablet 30 mg  30 mg Oral DAILY Katelyn Hernandez M.D.   30 mg at 03/20/21 2012     Facility-Administered Medications Ordered in Other Encounters   Medication Dose Route Frequency Provider Last Rate Last Admin   • Bupivacaine in NaCl PF injection   Epidural PRN Candida Lee M.D.   5 mL at 03/20/21 0512       Assessment:   Intrauterine pregnancy at 38 weeks and 1 day gestation  Active labor no second stage  Hypertension currently controlled with Procardia XL  Thrombophilia with recent anticoagulation with heparin  Patient has an epidural with good pain control  Category 1 fetal heart rate tracing    Plan:   Findings discussed  We will set up patient to start pushing

## 2021-03-21 NOTE — PROGRESS NOTES
Patient assessment completed. Discussed pain management plan and patient requests medications be offered as available. Patient denies dizziness and headaches; states she is voiding w/o difficulty. Pt states she feels she needs to have a BM, but is unable - requests laxative and stool softener. Dr. Hernandez notified and orders pending. Reviewed plan of care, all questions answered, and rounding in place.

## 2021-03-21 NOTE — PROGRESS NOTES
Late entry: Summary of events:    - Received report from GERI Coon RN. Assumed care of pt. Pt comfortable with epidural. Dr. Bello at . Strip reviewed. POC discussed- Dr. Bello to recheck pt around .    - Pt c/o pressure. Dr. Bello called to room. SVE complete.    -  of a viable female by Dr. Bello. Apgars 8/9    0145- Pt up to BR, voided, janet care done. Assisted to w/c, transferred to PP. Report to JENNY Velázquez RN

## 2021-03-21 NOTE — CARE PLAN
Problem: Altered physiologic condition related to immediate post-delivery state and potential for bleeding/hemorrhage  Goal: Patient physiologically stable as evidenced by normal lochia, palpable uterine involution and vital signs within normal limits  Outcome: PROGRESSING AS EXPECTED  Note: Patient VS stable and within defined limits. Heart rate slightly elevated at 120 on admission. Fundus firm at U, lochia light rubra at time of assessment.      Problem: Potential for postpartum infection related to presence of episiotomy/vaginal tear and/or uterine contamination  Goal: Patient will be absent from signs and symptoms of infection  Outcome: PROGRESSING AS EXPECTED  Note: Patient VS stable and within defined limits. Fundus firm at U, lochia light rubra at time of assessment.

## 2021-03-22 ENCOUNTER — TELEPHONE (OUTPATIENT)
Dept: OBGYN | Facility: CLINIC | Age: 31
End: 2021-03-22

## 2021-03-22 NOTE — DISCHARGE INSTRUCTIONS
PATIENT DISCHARGE EDUCATION INSTRUCTION SHEET  REASONS TO CALL YOUR OBSTETRICIAN  · Persistent fever, shaking, chills (Temperature higher than 100.4) may indicate you have an infection  · Heavy bleeding: soaking more than 1 pad per hour; Passing clots an egg-sized clot or bigger may mean you have an postpartum hemorrhage  · Foul odor from vagina or bad smelling or discolored discharge or blood  · Breast infection (Mastitis symptoms); breast pain, chills, fever, redness or red streaks, may feel flu like symptoms  · Urinary pain, burning or frequency  · Incision that is not healing, increased redness, swelling, tenderness or pain, or any pus from episiotomy or  site may mean you have an infection  · Redness, swelling, warmth, or painful to touch in the calf area of your leg may mean you have a blood clot  · Severe or intensified depression, thoughts or feelings of wanting to hurt yourself or someone else   · Pain in chest, obstructed breathing or shortness of breath (trouble catching your breath) may mean you are having a postpartum complication. Call your provider immediately   · Headache that does not get better, even after taking medicine, a bad headache with vision changes or pain in the upper right area of your belly may mean you have high blood pressure or post birth preeclampsia. Call your provider immediately    HAND WASHING  All family and friends should wash their hands:  · Before and after holding the baby  · Before feeding the baby  · After using the restroom or changing the baby's diaper    WOUND CARE  Ask your physician for additional care instructions. In general:  · Episiotomy/Laceration  · May use janet-spray bottle, witch hazel pads and dermaplast spray for comfort  · Use janet-spray bottle after urinating to cleanse perineal area  · To prevent burning during urination spray janet-water bottle on labial area   · Pat perineal area dry until episiotomy/laceration is healed  · Continue to use  janet-bottle until bleeding stops as needed  · If have a 2nd degree laceration or greater, a Sitz bath can offer relief from soreness, burning, and inflammation   · Sitz Bath   · Sit in 6 inches of warm water and soak laceration as needed until the laceration heals    VAGINAL CARE AND BLEEDING  · Nothing inside vagina for 6 weeks:   · No sexual intercourse, tampons or douching  · Bleeding may continue for 2-4 weeks. Amount and color may vary  · Soaking 1 pad or more in an hour for several hours is considered heavy bleeding  · Passing large egg sized blood clots can be concerning  · If you feel like you have heavy bleeding or are having increasing amount of blood clots call your Obstetrician immediately  · If you begin feeling faint upon standing, feeling sick to your stomach, have clammy skin, a really fast heartbeat, have chills, start feeling confused, dizzy, sleepy or weak, or feeling like you're going to faint call your Obstetrician immediately    HYPERTENSION   Preeclampsia or gestational hypertension are types of high blood pressure that only pregnant women can get. It is important for you to be aware of symptoms to seek early intervention and treatment. If you have any of these symptoms immediately call your Obstetrician    · Vision changes or blurred vision   · Severe headache or pain that is unrelieved with medication and will not go away  · Persistent pain in upper abdomen or shoulder   · Increased swelling of face, feet, or hands  · Difficulty breathing or shortness of breath at rest  · Urinating less than usual    URINATION AND BOWEL MOVEMENTS  · Eating more fiber (bran cereal, fruits, and vegetables) and drinking plenty of fluids will help to avoid constipation  · Urinary frequency and urgency after childbirth is normal  · If you experience any urinary pain, burning or frequency call your provider    BIRTH CONTROL  · It is possible to become pregnant at any time after delivery and while  "breastfeeding  · Plan to discuss a method of birth control with your physician at your post delivery follow up visit    POSTPARTUM BLUES  During the first few days after birth, you may experience a sense of the \"blues\" which may include impatience, irritability or even crying. These feelings come and go quickly. However, as many as 1 in 10 women experience emotional symptoms known as postpartum depression.     POSTPARTUM DEPRESSION  May start as early as the second or third day after delivery or take several weeks or months to develop. Symptoms of \"blues\" are present, but are more intense: Crying spells; loss of appetite; feelings of hopelessness or loss of control; fear of touching the baby; over concern or no concern at all about the baby; little or no concern about your own appearance/caring for yourself; and/or inability to sleep or excessive sleeping. Contact your Obstetrician if you are experiencing any of these symptoms     PREVENTING SHAKEN BABY  If you are angry or stressed, PUT THE BABY IN THE CRIB, step away, take some deep breaths, and wait until you are calm to care for the baby. DO NOT SHAKE THE BABY. You are not alone, call a supporter for help.  · [facility] 24/7 crisis call line (020-813-6139) or (1-316.284.7710)  · You can also text them, text \"ANSWER\" (682422)      "

## 2021-03-22 NOTE — PROGRESS NOTES
Received report from ARMEN Arevalo. Infant at bedside in open crib no signs of distress. Pt resting in bed, discussed plan of care and pain management for the night. Pt states she will call staff if she requires pain interventions or assistance through the day.  No further needs at this time.     Pt discharged at approximately 2320 via wheelchair with hospital escort. Infant placed in car seat by parent, and checked by RN.  Pt discharge instructions provided at approximately 2150 by this RN. This RN checked armbands removed clamp and cuddles.No further questions at this time.

## 2021-03-22 NOTE — CARE PLAN
Problem: Knowledge Deficit  Goal: Knowledge of disease process/condition, treatment plan, diagnostic tests, and medications will improve  Outcome: PROGRESSING AS EXPECTED  Note: POC discussed. All questions answered.      Problem: Potential for postpartum infection related to presence of episiotomy/vaginal tear and/or uterine contamination  Goal: Patient will be absent from signs and symptoms of infection  Outcome: PROGRESSING AS EXPECTED  Note: Patient is afebrile and absent for other signs/symptoms of infection. Vital signs WDL.  Will continue to monitor patient condition.

## 2021-03-22 NOTE — TELEPHONE ENCOUNTER
Patient called because she delivered 03/20/21 and she would like a letter faxed to  for delivery of child, advised pt that I will send the letter to  . Pt understood and agreed, pt states that she also needs her disability paperwork filled out advised pt that she can have those dropped off to the office to get filled out and pt wanted to schedule PP appt transferred pt to [facility] to schedule.

## 2021-03-25 ENCOUNTER — HOSPITAL ENCOUNTER (EMERGENCY)
Facility: MEDICAL CENTER | Age: 31
End: 2021-03-25
Attending: EMERGENCY MEDICINE
Payer: COMMERCIAL

## 2021-03-25 ENCOUNTER — APPOINTMENT (OUTPATIENT)
Dept: RADIOLOGY | Facility: MEDICAL CENTER | Age: 31
End: 2021-03-25
Attending: EMERGENCY MEDICINE
Payer: COMMERCIAL

## 2021-03-25 VITALS
TEMPERATURE: 97.9 F | OXYGEN SATURATION: 96 % | SYSTOLIC BLOOD PRESSURE: 123 MMHG | BODY MASS INDEX: 35.57 KG/M2 | HEART RATE: 88 BPM | HEIGHT: 64 IN | DIASTOLIC BLOOD PRESSURE: 83 MMHG | WEIGHT: 208.34 LBS | RESPIRATION RATE: 15 BRPM

## 2021-03-25 DIAGNOSIS — R00.2 PALPITATIONS: ICD-10-CM

## 2021-03-25 LAB
ALBUMIN SERPL BCP-MCNC: 3.7 G/DL (ref 3.2–4.9)
ALBUMIN/GLOB SERPL: 1.2 G/DL
ALP SERPL-CCNC: 78 U/L (ref 30–99)
ALT SERPL-CCNC: 80 U/L (ref 2–50)
ANION GAP SERPL CALC-SCNC: 11 MMOL/L (ref 7–16)
AST SERPL-CCNC: 38 U/L (ref 12–45)
BASOPHILS # BLD AUTO: 0.5 % (ref 0–1.8)
BASOPHILS # BLD: 0.05 K/UL (ref 0–0.12)
BILIRUB SERPL-MCNC: <0.2 MG/DL (ref 0.1–1.5)
BUN SERPL-MCNC: 6 MG/DL (ref 8–22)
CALCIUM SERPL-MCNC: 8.9 MG/DL (ref 8.5–10.5)
CHLORIDE SERPL-SCNC: 105 MMOL/L (ref 96–112)
CO2 SERPL-SCNC: 24 MMOL/L (ref 20–33)
CREAT SERPL-MCNC: 0.59 MG/DL (ref 0.5–1.4)
D DIMER PPP IA.FEU-MCNC: 2.84 UG/ML (FEU) (ref 0–0.5)
EKG IMPRESSION: NORMAL
EOSINOPHIL # BLD AUTO: 0.32 K/UL (ref 0–0.51)
EOSINOPHIL NFR BLD: 3 % (ref 0–6.9)
ERYTHROCYTE [DISTWIDTH] IN BLOOD BY AUTOMATED COUNT: 42.6 FL (ref 35.9–50)
GLOBULIN SER CALC-MCNC: 3.2 G/DL (ref 1.9–3.5)
GLUCOSE SERPL-MCNC: 100 MG/DL (ref 65–99)
HCT VFR BLD AUTO: 29 % (ref 37–47)
HGB BLD-MCNC: 9.7 G/DL (ref 12–16)
IMM GRANULOCYTES # BLD AUTO: 0.11 K/UL (ref 0–0.11)
IMM GRANULOCYTES NFR BLD AUTO: 1 % (ref 0–0.9)
LYMPHOCYTES # BLD AUTO: 2.4 K/UL (ref 1–4.8)
LYMPHOCYTES NFR BLD: 22.7 % (ref 22–41)
MAGNESIUM SERPL-MCNC: 2 MG/DL (ref 1.5–2.5)
MCH RBC QN AUTO: 30.6 PG (ref 27–33)
MCHC RBC AUTO-ENTMCNC: 33.4 G/DL (ref 33.6–35)
MCV RBC AUTO: 91.5 FL (ref 81.4–97.8)
MONOCYTES # BLD AUTO: 0.75 K/UL (ref 0–0.85)
MONOCYTES NFR BLD AUTO: 7.1 % (ref 0–13.4)
NEUTROPHILS # BLD AUTO: 6.92 K/UL (ref 2–7.15)
NEUTROPHILS NFR BLD: 65.7 % (ref 44–72)
NRBC # BLD AUTO: 0 K/UL
NRBC BLD-RTO: 0 /100 WBC
PLATELET # BLD AUTO: 437 K/UL (ref 164–446)
PMV BLD AUTO: 9.4 FL (ref 9–12.9)
POTASSIUM SERPL-SCNC: 3.5 MMOL/L (ref 3.6–5.5)
PROT SERPL-MCNC: 6.9 G/DL (ref 6–8.2)
RBC # BLD AUTO: 3.17 M/UL (ref 4.2–5.4)
SODIUM SERPL-SCNC: 140 MMOL/L (ref 135–145)
TSH SERPL DL<=0.005 MIU/L-ACNC: 1.68 UIU/ML (ref 0.38–5.33)
WBC # BLD AUTO: 10.6 K/UL (ref 4.8–10.8)

## 2021-03-25 PROCEDURE — 700117 HCHG RX CONTRAST REV CODE 255: Performed by: EMERGENCY MEDICINE

## 2021-03-25 PROCEDURE — 84443 ASSAY THYROID STIM HORMONE: CPT

## 2021-03-25 PROCEDURE — 99284 EMERGENCY DEPT VISIT MOD MDM: CPT | Mod: EDC

## 2021-03-25 PROCEDURE — 80053 COMPREHEN METABOLIC PANEL: CPT

## 2021-03-25 PROCEDURE — 85379 FIBRIN DEGRADATION QUANT: CPT

## 2021-03-25 PROCEDURE — 71275 CT ANGIOGRAPHY CHEST: CPT

## 2021-03-25 PROCEDURE — 83735 ASSAY OF MAGNESIUM: CPT

## 2021-03-25 PROCEDURE — 700105 HCHG RX REV CODE 258: Performed by: EMERGENCY MEDICINE

## 2021-03-25 PROCEDURE — 93005 ELECTROCARDIOGRAM TRACING: CPT

## 2021-03-25 PROCEDURE — 96374 THER/PROPH/DIAG INJ IV PUSH: CPT | Mod: EDC

## 2021-03-25 PROCEDURE — 93005 ELECTROCARDIOGRAM TRACING: CPT | Performed by: EMERGENCY MEDICINE

## 2021-03-25 PROCEDURE — 700111 HCHG RX REV CODE 636 W/ 250 OVERRIDE (IP): Performed by: EMERGENCY MEDICINE

## 2021-03-25 PROCEDURE — 85025 COMPLETE CBC W/AUTO DIFF WBC: CPT

## 2021-03-25 RX ORDER — SODIUM CHLORIDE, SODIUM LACTATE, POTASSIUM CHLORIDE, CALCIUM CHLORIDE 600; 310; 30; 20 MG/100ML; MG/100ML; MG/100ML; MG/100ML
1000 INJECTION, SOLUTION INTRAVENOUS ONCE
Status: COMPLETED | OUTPATIENT
Start: 2021-03-25 | End: 2021-03-25

## 2021-03-25 RX ORDER — KETOROLAC TROMETHAMINE 30 MG/ML
15 INJECTION, SOLUTION INTRAMUSCULAR; INTRAVENOUS ONCE
Status: COMPLETED | OUTPATIENT
Start: 2021-03-25 | End: 2021-03-25

## 2021-03-25 RX ADMIN — IOHEXOL 40 ML: 350 INJECTION, SOLUTION INTRAVENOUS at 22:40

## 2021-03-25 RX ADMIN — SODIUM CHLORIDE, POTASSIUM CHLORIDE, SODIUM LACTATE AND CALCIUM CHLORIDE 1000 ML: 600; 310; 30; 20 INJECTION, SOLUTION INTRAVENOUS at 21:00

## 2021-03-25 RX ADMIN — KETOROLAC TROMETHAMINE 15 MG: 30 INJECTION, SOLUTION INTRAMUSCULAR at 21:01

## 2021-03-25 ASSESSMENT — FIBROSIS 4 INDEX: FIB4 SCORE: 0.36

## 2021-03-25 ASSESSMENT — LIFESTYLE VARIABLES: DO YOU DRINK ALCOHOL: NO

## 2021-03-26 NOTE — ED PROVIDER NOTES
ED Provider Note    ER PROVIDER NOTE      CHIEF COMPLAINT  Chief Complaint   Patient presents with    Rapid Heart Beat     HR at home 160's here 140's    Hypertension     hx of HTN 's at home delivered baby on saturday        HPI  Marcio Stoll is a 30 y.o. female who presents to the emergency department complaining of rapid heartbeats and lightheadedness.  Patient reports that earlier this evening she began to feel like her heart was racing, she was getting readings between 140 and 160, she also felt somewhat lightheaded with this.  She reports no chest pain, she did have some dyspnea on exertion although no other shortness of breath.  These symptoms seem to have resolved now.  She reports no recent fevers chills cough or congestion.  She does have a slight left-sided headache consistent with her past migraines that gradually onset earlier.  She reports no leg pain or swelling, she is 1 week status post spontaneous vaginal delivery she is still having some slight spotting that is improving, no issues with the delivery itself.     She does have prior history of pulmonary embolism and has been taking Lovenox to her pregnancy, also prior history of hypertension and was on nicardipine through pregnancy, she did note her blood pressure was elevated to 160s earlier today    REVIEW OF SYSTEMS  Pertinent positives include palpitations. Pertinent negatives include no chest pain. See HPI for details. All other systems reviewed and are negative.    PAST MEDICAL HISTORY   has a past medical history of Asthma, Clotting disorder (HCC), History of pulmonary embolism (8/27/2020), Hypertension, Migraine, and Rh negative state in antepartum period, second trimester (9/30/2020).    SURGICAL HISTORY  patient denies any surgical history    FAMILY HISTORY  Family History   Problem Relation Age of Onset    Hypertension Mother     Alcohol abuse Mother     Hypertension Father     Breast Cancer Maternal Aunt     Hypertension  Maternal Grandmother     Diabetes Maternal Grandmother     Hypertension Paternal Grandmother     Diabetes Paternal Grandmother        SOCIAL HISTORY  Social History     Socioeconomic History    Marital status:      Spouse name: Not on file    Number of children: Not on file    Years of education: Not on file    Highest education level: Not on file   Occupational History    Not on file   Tobacco Use    Smoking status: Never Smoker    Smokeless tobacco: Never Used   Substance and Sexual Activity    Alcohol use: Not Currently    Drug use: Never    Sexual activity: Yes     Partners: Male     Birth control/protection: Pill     Comment:  and planned pregnancy. FOB is involved and supportive.   Other Topics Concern    Not on file   Social History Narrative    ** Merged History Encounter **         RN on tele floor at [employer]     Financial Resource Strain:     Difficulty of Paying Living Expenses:    Food Insecurity:     Worried About Running Out of Food in the Last Year:     Ran Out of Food in the Last Year:    Transportation Needs:     Lack of Transportation (Medical):     Lack of Transportation (Non-Medical):    Physical Activity:     Days of Exercise per Week:     Minutes of Exercise per Session:    Stress:     Feeling of Stress :    Social Connections:     Frequency of Communication with Friends and Family:     Frequency of Social Gatherings with Friends and Family:     Attends [employer] Services:     Active Member of Clubs or Organizations:     Attends Club or Organization Meetings:     Marital Status:    Intimate Partner Violence:     Fear of Current or Ex-Partner:     Emotionally Abused:     Physically Abused:     Sexually Abused:       Social History     Substance and Sexual Activity   Drug Use Never       CURRENT MEDICATIONS  Home Medications    **Home medications have not yet been reviewed for this encounter**         ALLERGIES  No Known Allergies    PHYSICAL  "EXAM  VITAL SIGNS: /83   Pulse 88   Temp 36.4 °C (97.6 °F) (Temporal)   Resp 18   Ht 1.626 m (5' 4\")   Wt 94.5 kg (208 lb 5.4 oz)   LMP 06/26/2020 (Exact Date)   SpO2 96%   BMI 35.76 kg/m²   Pulse ox interpretation: I interpret this pulse ox as normal.    Constitutional: Alert in no apparent distress.  HENT: No signs of trauma, Bilateral external ears normal, Nose normal.   Eyes: Pupils are equal and reactive, Conjunctiva normal, Non-icteric.   Neck: Normal range of motion, No tenderness, Supple, No stridor.   Lymphatic: No lymphadenopathy noted.   Cardiovascular: Tachycardic, regular, no murmurs.   Thorax & Lungs: Normal breath sounds, No respiratory distress, No wheezing, No chest tenderness.   Abdomen: Bowel sounds normal, Soft, No tenderness, No masses, No pulsatile masses. No peritoneal signs.  Skin: Warm, Dry, No erythema, No rash.   Back: No bony tenderness, No CVA tenderness.   Extremities: Intact distal pulses, No edema, No tenderness, No cyanosis, Negative Sherri's sign.  Musculoskeletal: Good range of motion in all major joints. No tenderness to palpation or major deformities noted.   Neurologic: Alert , Normal motor function, Normal sensory function, No focal deficits noted.   Psychiatric: Affect normal, Judgment normal, Mood normal.     DIAGNOSTIC STUDIES / PROCEDURES    EKG  Results for orders placed or performed during the hospital encounter of 03/25/21   CBC WITH DIFFERENTIAL   Result Value Ref Range    WBC 10.6 4.8 - 10.8 K/uL    RBC 3.17 (L) 4.20 - 5.40 M/uL    Hemoglobin 9.7 (L) 12.0 - 16.0 g/dL    Hematocrit 29.0 (L) 37.0 - 47.0 %    MCV 91.5 81.4 - 97.8 fL    MCH 30.6 27.0 - 33.0 pg    MCHC 33.4 (L) 33.6 - 35.0 g/dL    RDW 42.6 35.9 - 50.0 fL    Platelet Count 437 164 - 446 K/uL    MPV 9.4 9.0 - 12.9 fL    Neutrophils-Polys 65.70 44.00 - 72.00 %    Lymphocytes 22.70 22.00 - 41.00 %    Monocytes 7.10 0.00 - 13.40 %    Eosinophils 3.00 0.00 - 6.90 %    Basophils 0.50 0.00 - 1.80 %    " Immature Granulocytes 1.00 (H) 0.00 - 0.90 %    Nucleated RBC 0.00 /100 WBC    Neutrophils (Absolute) 6.92 2.00 - 7.15 K/uL    Lymphs (Absolute) 2.40 1.00 - 4.80 K/uL    Monos (Absolute) 0.75 0.00 - 0.85 K/uL    Eos (Absolute) 0.32 0.00 - 0.51 K/uL    Baso (Absolute) 0.05 0.00 - 0.12 K/uL    Immature Granulocytes (abs) 0.11 0.00 - 0.11 K/uL    NRBC (Absolute) 0.00 K/uL   COMP METABOLIC PANEL   Result Value Ref Range    Sodium 140 135 - 145 mmol/L    Potassium 3.5 (L) 3.6 - 5.5 mmol/L    Chloride 105 96 - 112 mmol/L    Co2 24 20 - 33 mmol/L    Anion Gap 11.0 7.0 - 16.0    Glucose 100 (H) 65 - 99 mg/dL    Bun 6 (L) 8 - 22 mg/dL    Creatinine 0.59 0.50 - 1.40 mg/dL    Calcium 8.9 8.5 - 10.5 mg/dL    AST(SGOT) 38 12 - 45 U/L    ALT(SGPT) 80 (H) 2 - 50 U/L    Alkaline Phosphatase 78 30 - 99 U/L    Total Bilirubin <0.2 0.1 - 1.5 mg/dL    Albumin 3.7 3.2 - 4.9 g/dL    Total Protein 6.9 6.0 - 8.2 g/dL    Globulin 3.2 1.9 - 3.5 g/dL    A-G Ratio 1.2 g/dL   MAGNESIUM   Result Value Ref Range    Magnesium 2.0 1.5 - 2.5 mg/dL   TSH   Result Value Ref Range    TSH 1.680 0.380 - 5.330 uIU/mL   D-DIMER   Result Value Ref Range    D-Dimer Screen 2.84 (H) 0.00 - 0.50 ug/mL (FEU)   ESTIMATED GFR   Result Value Ref Range    GFR If African American >60 >60 mL/min/1.73 m 2    GFR If Non African American >60 >60 mL/min/1.73 m 2   EKG   Result Value Ref Range    Report       [facility] Emergency Dept.    Test Date:  2021  Pt Name:    ROSALVA MATA             Department: ER  MRN:        [id number]                      Room:  Gender:     Female                       Technician: [id number]  :        1990                   Requested By:ER TRIAGE PROTOCOL  Order #:    [id number]                    Reading MD: NATASHA HOUSTON MD    Measurements  Intervals                                Axis  Rate:       113                          P:          53  VA:         152                          QRS:        11  QRSD:        82                           T:          17  QT:         328  QTc:        450    Interpretive Statements  SINUS TACHYCARDIA  No previous ECG available for comparison  Electronically Signed On 3- 20:49:31 PDT by NATASHA HOUSTON MD           RADIOLOGY  CT-CTA CHEST PULMONARY ARTERY W/ RECONS   Final Result      No evidence of pulmonary embolus.              The radiologist's interpretation of all radiological studies have been reviewed and images independently viewed by me.    COURSE & MEDICAL DECISION MAKING  Nursing notes, VS, PMSFHx reviewed in chart.    8:17 PM Patient seen and examined at bedside. Patient will be treated with IV fluid, ketorolac. Ordered for BC, CMP, TSH, D-dimer to evaluate her symptoms.     9:36 PM  Patient is reevaluated, her heart rate is in the 90s, she is comfortable, updated on results, plan for CTA    11:04 PM  Patient is reevaluated, updated on results, heart rate 90s, she is comfortable, plan for discharge    This patient was cared for during the COVID-19 pandemic.  History and physical exam may be limited/truncated by the inherent challenges of PPE and the need to decrease staff exposure to novel coronavirus.  Some aspects of disease management may be different to protect staff and help slow the spread of disease. I verified that, if possible, the patient was wearing a mask and I was wearing appropriate PPE every time I encountered the patient.     Current [facility] COVID19 protocol followed      HYDRATION: Based on the patient's presentation of Tachycardia the patient was given IV fluids. IV Hydration was used because oral hydration was not as rapid as required. Upon recheck following hydration, the patient was improved.    Decision Making:  This is a 30 y.o. female presenting with palpitations and lightheadedness.  Her symptoms did resolve, and no acute pathology is noted here.  ECG did show sinus tachycardia but not show any evidence of arrythmia, prolonged intervals, early  excitation, or other abnormality such as an accessory pathway, qt prolongation, or brugada syndrome. ACS or MI are unlikely causes given nature of sx, unremarkable ECG and given the patients improvement the likely of acs of mi is very low. There is no evidence of metabolic abnormalities to explain this episode.  She has had prior pulmonary embolism and with her recent pregnancy this was considered as well, her D-dimer is elevated, CT scan was obtained which was unremarkable.  She did have pre-existing hypertension, and her blood pressure has been well controlled here so I do not suspect postpartum preeclampsia at this point.  Patient also did have some headache, this appears to be more chronic for his she states it feels just like her past migraines and she was offered medication for this but preferred to take her Imitrex at home.  Given the exact similarity to past, seems less likely to be venous thrombosis, or other emergent cause of her headache.  She is neurologically intact       The patient will return for new or worsening symptoms and is stable at the time of discharge.    The patient is referred to a primary physician for blood pressure management, diabetic screening, and for all other preventative health concerns.      DISPOSITION:  Patient will be discharged home in stable condition.    FOLLOW UP:  Anita Thornton M.D.  [address] [facility] [address]  [place] [id number] [phone]    In 1 week      CARDIOLOGY [facility]  [address]  [place] [address]  [phone]          OUTPATIENT MEDICATIONS:  New Prescriptions    No medications on file         FINAL IMPRESSION  1. Palpitations         The note accurately reflects work and decisions made by me.  Mark London M.D.  3/26/2021  2:03 AM

## 2021-03-26 NOTE — ED NOTES
"Pt discharged home via ambulatory to lobby with steady gait, AOx4, family accompanying. IV discontinued and gauze placed, pt in possession of belongings. Pt provided discharge education and information pertaining to medications and follow up appointments. Pt received copy of discharge instructions and verbalized understanding.     /83   Pulse 88   Temp 36.6 °C (97.9 °F) (Oral)   Resp 15   Ht 1.626 m (5' 4\")   Wt 94.5 kg (208 lb 5.4 oz)   LMP 06/26/2020 (Exact Date)   SpO2 96%   BMI 35.76 kg/m²   "

## 2021-03-26 NOTE — ED TRIAGE NOTES
"Chief Complaint   Patient presents with   • Rapid Heart Beat     HR at home 160's here 140's   • Hypertension     hx of HTN 's at home delivered baby on saturday      Pt walked in for above complaint. Patient states about an hour ago she became flushed, developed headache, her heart was pounding, and overall did not feel great. She took her blood pressure at home and it was 160's systolic w/ 's. Patient states she is compliant w/ her BP medication. Pt denies any complications during pregnancy or birth. Pt also has hx DVT and is currently on lovenox. Charge made aware. Patient placed back in the lobby and educated on ER process.     BP (!) 161/106   Pulse (!) 139   Temp 36.4 °C (97.6 °F) (Temporal)   Resp 19   Ht 1.626 m (5' 4\")   Wt 94.5 kg (208 lb 5.4 oz)   LMP 06/26/2020 (Exact Date)   SpO2 97%   BMI 35.76 kg/m²     "

## 2021-03-26 NOTE — ED NOTES
Pt ambulatory with steady gait to R1, pt in gown and on monitor, call light in reach, chart up for ERP.

## 2021-04-12 ENCOUNTER — TELEPHONE (OUTPATIENT)
Dept: OBGYN | Facility: CLINIC | Age: 31
End: 2021-04-12

## 2021-04-12 NOTE — TELEPHONE ENCOUNTER
Pt LM on VM inquiring about status of her FMLA forms, (forms are scanned in media).  Called pt, unable to reach her. LMTCB

## 2021-04-21 ENCOUNTER — POST PARTUM (OUTPATIENT)
Dept: OBGYN | Facility: CLINIC | Age: 31
End: 2021-04-21
Payer: COMMERCIAL

## 2021-04-21 VITALS — WEIGHT: 204 LBS | DIASTOLIC BLOOD PRESSURE: 69 MMHG | BODY MASS INDEX: 35.02 KG/M2 | SYSTOLIC BLOOD PRESSURE: 104 MMHG

## 2021-04-21 PROCEDURE — 90050 PR POSTPARTUM VISIT: CPT | Performed by: OBSTETRICS & GYNECOLOGY

## 2021-04-21 RX ORDER — NORETHINDRONE ACETATE AND ETHINYL ESTRADIOL .02; 1 MG/1; MG/1
1 TABLET ORAL DAILY
Qty: 84 TABLET | Refills: 0 | Status: SHIPPED | OUTPATIENT
Start: 2021-04-21

## 2021-04-21 RX ORDER — NIFEDIPINE 30 MG
30 TABLET, EXTENDED RELEASE ORAL DAILY
Qty: 90 TABLET | Refills: 0 | Status: SHIPPED | OUTPATIENT
Start: 2021-04-21

## 2021-04-21 RX ORDER — SUMATRIPTAN 50 MG/1
50 TABLET, FILM COATED ORAL
Qty: 30 TABLET | Refills: 0 | Status: SHIPPED | OUTPATIENT
Start: 2021-04-21

## 2021-04-21 ASSESSMENT — FIBROSIS 4 INDEX: FIB4 SCORE: 0.3

## 2021-04-21 NOTE — NON-PROVIDER
Pt here today for postpartum exam.  Delivery type: vaginal delivery on 03/20/2021  Currently : bottle feeding  Desired BCM: interested in pills  LMP: not yet  Last pap: 09/30/2020, +HPV. Needs repeat in September.  Phone # [phone]

## 2021-04-21 NOTE — PROGRESS NOTES
Marcio Stoll is a 30 y.o. female who presents for her Postpartum Exam      HPI: Pt presents for postpartum exam s/p normal spontaneous vaginal delivery.  She is still taking nifedipine 30 mg XL daily.  She recently found out that she will be moving to [place] in the future and requests refills of the nifedipine, birth control pills, as well as Imitrex for her migraines.  Baby doing well.  She is not producing much milk and is going to wean.  No depression/anxiety.  Not sexually active.  Lochia resolved.  Desires birth control pills for contraception.  EDPS score: 7  Last pap: September 2020 + HPV    Review of Systems:   Pertinent positives documented in HPI and all other systems reviewed & are negative    Physical exam:   Vital measurements:  /69   Wt 92.5 kg (204 lb)   Body mass index is 35.02 kg/m². (Goal BMI>18 <25)  Nursing note and vitals reviewed.  Gen: She is oriented to person, place, and time. She appears well-developed and well-nourished. No distress.   Heart: Heart regular rate and rhythm  Lungs: clear to auscultation bilaterally  Breasts: nontender, not engorged, no dominant masses, nipples intact  Abdomen: soft, nontender, nondistended, no rebound/guarding  Extremities: nontender, no clubbing, cyanosis or edema  Pelvic: Normal external female genitalia, well-healed perineum from delivery.Vicryl suture noted at the introitus and was removed.  Cervix is normal, uterus approximately 5 weeks in size non tender, adnexa bilaterally normal with no dominant masses    A/P: Postpartum status post normal spontaneous vaginal delivery  Continue nifedipine 30 XL for chronic hypertension for now.  Discussed with patient that this medication is not the best for chronic hypertension in a nonpregnant adult and thus she should establish with a primary care soon as possible.  Doing well without complaints  Continue prenatal vitamins if breast feeding  May resume normal activities including exercise,  tampons, and intercourse  Discussed various methods of contraception with the patient including OCs, ring, patch, DepoProvera, IUD, Nexplanon, and tubal ligation. Also discussed barrier method and rhythm methods.   Contraception oral contraceptives sent for the patient.  Discussed that although her blood pressures are currently well controlled, oral contraceptives in the setting of hypertension can cause difficult control blood pressures.  She was advised to establish quickly with a primary care physician in her new setting and strongly consider Mirena in the future.  Follow up in September for annual exam or sooner as needed

## 2021-04-26 ASSESSMENT — EDINBURGH POSTNATAL DEPRESSION SCALE (EPDS)
I HAVE BEEN ANXIOUS OR WORRIED FOR NO GOOD REASON: YES, SOMETIMES
THE THOUGHT OF HARMING MYSELF HAS OCCURRED TO ME: NEVER
I HAVE BEEN SO UNHAPPY THAT I HAVE BEEN CRYING: NO, NEVER
I HAVE FELT SAD OR MISERABLE: NOT VERY OFTEN
I HAVE BEEN ABLE TO LAUGH AND SEE THE FUNNY SIDE OF THINGS: AS MUCH AS I ALWAYS COULD
I HAVE BEEN SO UNHAPPY THAT I HAVE HAD DIFFICULTY SLEEPING: NOT AT ALL
THINGS HAVE BEEN GETTING ON TOP OF ME: NO, MOST OF THE TIME I HAVE COPED QUITE WELL
I HAVE LOOKED FORWARD WITH ENJOYMENT TO THINGS: AS MUCH AS I EVER DID
I HAVE BLAMED MYSELF UNNECESSARILY WHEN THINGS WENT WRONG: YES, SOME OF THE TIME
TOTAL SCORE: 7
I HAVE FELT SCARED OR PANICKY FOR NO GOOD REASON: NO, NOT MUCH

## 2023-09-29 ENCOUNTER — DOCUMENTATION (OUTPATIENT)
Dept: HEALTH INFORMATION MANAGEMENT | Facility: OTHER | Age: 33
End: 2023-09-29
Payer: COMMERCIAL